# Patient Record
Sex: FEMALE | Race: BLACK OR AFRICAN AMERICAN | Employment: OTHER | URBAN - METROPOLITAN AREA
[De-identification: names, ages, dates, MRNs, and addresses within clinical notes are randomized per-mention and may not be internally consistent; named-entity substitution may affect disease eponyms.]

---

## 2018-08-14 PROBLEM — R10.9 ABDOMINAL PAIN: Status: ACTIVE | Noted: 2018-08-14

## 2018-08-14 PROBLEM — R11.2 NAUSEA WITH VOMITING: Status: ACTIVE | Noted: 2018-08-14

## 2018-08-14 PROBLEM — T68.XXXA HYPOTHERMIA: Status: ACTIVE | Noted: 2018-08-14

## 2018-08-14 PROBLEM — R11.10 VOMITING: Status: ACTIVE | Noted: 2018-08-14

## 2018-08-15 PROBLEM — K31.84 GASTROPARESIS: Status: ACTIVE | Noted: 2018-08-15

## 2019-01-04 PROBLEM — I48.91 ATRIAL FIBRILLATION AND FLUTTER (HCC): Status: ACTIVE | Noted: 2019-01-04

## 2019-01-04 PROBLEM — R06.02 SHORT OF BREATH ON EXERTION: Status: ACTIVE | Noted: 2019-01-04

## 2019-01-04 PROBLEM — I48.92 ATRIAL FIBRILLATION AND FLUTTER (HCC): Status: ACTIVE | Noted: 2019-01-04

## 2019-08-06 ENCOUNTER — OFFICE VISIT (OUTPATIENT)
Dept: ONCOLOGY | Age: 59
End: 2019-08-06

## 2019-08-06 ENCOUNTER — HOSPITAL ENCOUNTER (OUTPATIENT)
Dept: ONCOLOGY | Age: 59
Discharge: HOME | End: 2019-08-06

## 2019-08-06 VITALS
SYSTOLIC BLOOD PRESSURE: 123 MMHG | HEIGHT: 67 IN | DIASTOLIC BLOOD PRESSURE: 82 MMHG | HEART RATE: 61 BPM | RESPIRATION RATE: 18 BRPM | WEIGHT: 272 LBS | BODY MASS INDEX: 42.69 KG/M2 | TEMPERATURE: 98.3 F | OXYGEN SATURATION: 98 %

## 2019-08-06 DIAGNOSIS — D50.8 OTHER IRON DEFICIENCY ANEMIA: ICD-10-CM

## 2019-08-06 DIAGNOSIS — D50.8 IRON DEFICIENCY ANEMIA SECONDARY TO INADEQUATE DIETARY IRON INTAKE: ICD-10-CM

## 2019-08-06 DIAGNOSIS — D50.0 IRON DEFICIENCY ANEMIA DUE TO CHRONIC BLOOD LOSS: Primary | ICD-10-CM

## 2019-08-06 LAB
BASO+EOS+MONOS # BLD AUTO: 0.7 K/UL (ref 0–2.3)
BASO+EOS+MONOS NFR BLD AUTO: 15 % (ref 0.1–17)
DIFFERENTIAL METHOD BLD: ABNORMAL
ERYTHROCYTE [DISTWIDTH] IN BLOOD BY AUTOMATED COUNT: 17.3 % (ref 11.5–14.5)
HCT VFR BLD AUTO: 26.8 % (ref 36–48)
HGB BLD-MCNC: 7.4 G/DL (ref 12–16)
LYMPHOCYTES # BLD: 1.1 K/UL (ref 1.1–5.9)
LYMPHOCYTES NFR BLD: 25 % (ref 14–44)
MCH RBC QN AUTO: 18.5 PG (ref 25–35)
MCHC RBC AUTO-ENTMCNC: 27.6 G/DL (ref 31–37)
MCV RBC AUTO: 66.8 FL (ref 78–102)
NEUTS SEG # BLD: 2.6 K/UL (ref 1.8–9.5)
NEUTS SEG NFR BLD: 60 % (ref 40–70)
PLATELET # BLD AUTO: 267 K/UL (ref 140–440)
RBC # BLD AUTO: 4.01 M/UL (ref 4.1–5.1)
WBC # BLD AUTO: 4.4 K/UL (ref 4.5–13)

## 2019-08-06 NOTE — PATIENT INSTRUCTIONS
Iron Deficiency Anemia: Care Instructions Your Care Instructions Anemia means that you do not have enough red blood cells. Red blood cells carry oxygen around your body. When you have anemia, it can make you pale, weak, and tired. Many things can cause anemia. The most common cause is loss of blood. This can happen if you have heavy menstrual periods. It can also happen if you have bleeding in your stomach or bowel. You can also get anemia if you don't have enough iron in your diet or if it's hard for your body to absorb iron. In some cases, pregnancy causes anemia. That's because a pregnant woman needs more iron. Your doctor may do more tests to find the cause of your anemia. If a disease or other health problem is causing it, your doctor will treat that problem. It's important to follow up with your doctor to make sure that your iron level returns to normal. 
Follow-up care is a key part of your treatment and safety. Be sure to make and go to all appointments, and call your doctor if you are having problems. It's also a good idea to know your test results and keep a list of the medicines you take. How can you care for yourself at home? · If your doctor recommended iron pills, take them as directed. ? Try to take the pills on an empty stomach. You can do this about 1 hour before or 2 hours after meals. But you may need to take iron with food to avoid an upset stomach. ? Do not take antacids or drink milk or anything with caffeine within 2 hours of when you take your iron. They can keep your body from absorbing the iron well. ? Vitamin C helps your body absorb iron. You may want to take iron pills with a glass of orange juice or some other food high in vitamin C. 
? Iron pills may cause stomach problems. These include heartburn, nausea, diarrhea, constipation, and cramps. It can help to drink plenty of fluids and include fruits, vegetables, and fiber in your diet. ? It's normal for iron pills to make your stool a greenish or grayish black. But internal bleeding can also cause dark stool. So it's important to tell your doctor about any color changes. ? Call your doctor if you think you are having a problem with your iron pills. Even after you start to feel better, it will take several months for your body to build up its supply of iron. ? If you miss a pill, don't take a double dose. ? Keep iron pills out of the reach of small children. Too much iron can be very dangerous. · Eat foods with a lot of iron. These include red meat, shellfish, poultry, and eggs. They also include beans, raisins, whole-grain bread, and leafy green vegetables. · Steam your vegetables. This is the best way to prepare them if you want to get as much iron as possible. · Be safe with medicines. Do not take nonsteroidal anti-inflammatory pain relievers unless your doctor tells you to. These include aspirin, naproxen (Aleve), and ibuprofen (Advil, Motrin). · Liquid iron can stain your teeth. But you can mix it with water or juice and drink it with a straw. Then it won't get on your teeth. When should you call for help? Call 911 anytime you think you may need emergency care. For example, call if: 
  · You passed out (lost consciousness).  
 Call your doctor now or seek immediate medical care if: 
  · You are short of breath.  
  · You are dizzy or light-headed, or you feel like you may faint.  
  · You have new or worse bleeding.  
 Watch closely for changes in your health, and be sure to contact your doctor if: 
  · You feel weaker or more tired than usual.  
  · You do not get better as expected. Where can you learn more? Go to http://willy-micki.info/. Enter R277 in the search box to learn more about \"Iron Deficiency Anemia: Care Instructions. \" Current as of: March 28, 2019 Content Version: 12.1 © 5341-2542 Healthwise, Incorporated.  Care instructions adapted under license by [address] (which disclaims liability or warranty for this information). If you have questions about a medical condition or this instruction, always ask your healthcare professional. [address] any warranty or liability for your use of this information.

## 2019-08-06 NOTE — PROGRESS NOTES
Hematology/Oncology Consultation Note    Name: Sharon Whipple  Date: 2019  : 1960    Scar De Oliveira MD       Ms. Parviz Chávez  is a 59 y.o. -American woman who was referred here for an evaluation of anemia. Subjective:     Chief complaint: Anemia    History of present illness:  Ms. Parviz Chávez is a 59-year-old -American woman who patient reports that she has been anemic for many years. She states that she does have internal hemorrhoids and at times she has noticed some blood in the stool. She denies having blood in her urine. She has been taking iron pills off and on for a very long time. Nonetheless she is complaining of some fatigue and weakness. Occasionally she feels shortness of breath and dizziness as well. She states that many years ago she was given intravenous iron therapy. She denies ever undergoing any type of bariatric surgical procedures for weight loss. I have explained to the patient that I have been able to find a lab test from 2019 which showed that her hemoglobin was 8.5 g/dL with hematocrit of 29.5%. Currently she has no additional complaints or concerns.     Past Medical History:   Diagnosis Date    Anemia 2010    Asthma 2010    Essential hypertension, benign 2010    High cholesterol     Hypertension     Nonspecific elevation of levels of transaminase or lactic acid dehydrogenase (LDH) 2010    Obstructive sleep apnea     TD (obstructive sleep apnea) 2010    Polyp of intestine 10/3/08    gastric hyperplastic polyp/Dr. Cheryl Holden    Seizure disorder ([facility] [address] 2010    Seizure disorder ([facility] [address]     daugther reported       Allergies   Allergen Reactions    Pcn [Penicillins] Hives       Past Surgical History:   Procedure Laterality Date    ABDOMEN SURGERY PROC UNLISTED      CHEST SURGERY PROCEDURE UNLISTED  2010    Desmoid Tumor [facility] Dr. Wendy Cobb  16    Dr. Tha Cowan 3/27/09, 4/16/09    Dr. Rosemarie Beckham [facility] [id number], [address] History     Socioeconomic History    Marital status: SINGLE     Spouse name: Not on file    Number of children: Not on file    Years of education: Not on file    Highest education level: Not on file   Occupational History    Not on file   Social Needs    Financial resource strain: Not on file    Food insecurity:     Worry: Not on file     Inability: Not on file    Transportation needs:     Medical: Not on file     Non-medical: Not on file   Tobacco Use    Smoking status: Never Smoker    Smokeless tobacco: Never Used   Substance and Sexual Activity    Alcohol use: No    Drug use: No    Sexual activity: Not Currently   Lifestyle    Physical activity:     Days per week: Not on file     Minutes per session: Not on file    Stress: Not on file   Relationships    Social connections:     Talks on phone: Not on file     Gets together: Not on file     Attends [employer] service: Not on file     Active member of club or organization: Not on file     Attends meetings of clubs or organizations: Not on file     Relationship status: Not on file    Intimate partner violence:     Fear of current or ex partner: Not on file     Emotionally abused: Not on file     Physically abused: Not on file     Forced sexual activity: Not on file   Other Topics Concern    Not on file   Social History Narrative    Not on file       Family History   Problem Relation Age of Onset    Hypertension Sister     Hypertension Brother     Asthma Daughter        Current Outpatient Medications   Medication Sig Dispense Refill    benzonatate (TESSALON) 100 mg capsule Take 1 Cap by mouth three (3) times daily as needed for Cough. 10 Cap 0    guaiFENesin (ROBITUSSIN) 100 mg/5 mL liquid Take 5 mL by mouth four (4) times daily as needed for Cough or Congestion. 1 Bottle 0    sucralfate (CARAFATE) 100 mg/mL suspension Take 2 tsp by mouth four (4) times daily.       furosemide (LASIX) 20 mg tablet Take 20 mg by mouth two (2) times a day.  ergocalciferol (VITAMIN D2) 50,000 unit capsule Take 50,000 Units by mouth every seven (7) days.  hydrALAZINE (APRESOLINE) 100 mg tablet Take 100 mg by mouth three (3) times daily.  valsartan-hydroCHLOROthiazide (DIOVAN-HCT) 320-12.5 mg per tablet Take 1 Tab by mouth daily.  cetirizine (ZYRTEC) 10 mg tablet Take 10 mg by mouth daily.  potassium chloride (K-DUR, KLOR-CON) 20 mEq tablet Take 20 mEq by mouth daily.  fluticasone (FLONASE ALLERGY RELIEF) 50 mcg/actuation nasal spray 1 [place] by Both Nostrils route two (2) times a day.  polyethylene glycol (MIRALAX) 17 gram packet Take 34 g with 16 oz beverage every day till regular bowel movements are established -> then continue 17 g everyday 60 Packet 1    ondansetron (ZOFRAN ODT) 4 mg disintegrating tablet Take 1 Tab by mouth every eight (8) hours as needed for Nausea. (Patient taking differently: Take 8 mg by mouth every eight (8) hours as needed for Nausea.) 30 Tab 0    methocarbamol (ROBAXIN-750) 750 mg tablet Take 1 Tab by mouth four (4) times daily. 16 Tab 0    simvastatin (ZOCOR) 10 mg tablet Take 10 mg by mouth nightly.  omeprazole (PRILOSEC) 40 mg capsule Take 40 mg by mouth daily.  albuterol (PROAIR HFA) 90 mcg/Actuation inhaler Take 1-2 Puffs by inhalation every four (4) hours as needed for Wheezing and Shortness of Breath. (Patient taking differently: Take 2 Puffs by inhalation every six (6) hours as needed for Wheezing or Shortness of Breath.) 3 Inhaler 5    fluticasone-salmeterol (ADVAIR DISKUS) 250-50 mcg/dose diskus inhaler Take 1 Puff by inhalation every twelve (12) hours. 1 Inhaler 5    albuterol (PROVENTIL VENTOLIN) 2.5 mg /3 mL (0.083 %) nebulizer solution 3 mL by Nebulization route every four (4) hours as needed for Wheezing.  1 Package 3     Review of Systems    General ROS:The patient has complaints of some fatigue and weakness. Psychological ROS: patient denies having any psychological symptoms such as hallucinations, depression or anxiety. Ophthalmic ROS:the patient denies having any visual impairment or eye discomfort. ENT ROS: there are no abnormalities reported. Allergy and Immunology ROS:the patient denies having any seasonal allergies or allergies to medications other than those already outlined above. Hematological and Lymphatic ROS: the patient denies having any bruising, bleeding or lymphadenopathy. Endocrine ROS: the patient denies having any heat or cold intolerance. There is no history of diabetes or thyroid disorders. Breast ROS: the patient denies having any history of breast mass, nipple discharge, or lumps. Respiratory ROS:the patient denies having any cough, shortness of breath, or dyspnea on exertion. Cardiovascular ROS: there are no complaints of chest pain, palpitations, chest pounding, or dyspnea on exertion. Gastrointestinal ROS: the patient denies having nausea, emesis, diarrhea, constipation, or blood in the stool. Genito-Urinary ROS: the patient denies having urinary urgency, frequency, or dysuria. Musculoskeletal ROS: with the exception of mild arthralgias the patient has no other musculoskeletal complaints. Neurological ROS: the patient denies having any numbness, tingling, or neurologic deficits. Dermatological ROS:patient denies having any unexplained rash, skin ulcerations, or hives. Objective:     Visit Vitals  /82   Pulse 61   Temp 98.3 °F (36.8 °C) (Oral)   Resp 18   Ht 5' 7\" (1.702 m)   Wt 123.4 kg (272 lb)   SpO2 98%   BMI 42.60 kg/m²        ECOGPS=1  Physical Exam:   Gen. Appearance: the patient is in no acute distress. Skin: There is no evidence of bruise or rash. HEENT: The head is normocephalic and atraumatic. The conjunctiva and sclera are clear. Pupils are equal, round, reactive to light, and accommodation. The extraocular movements are intact.   ENT reveals no oral mucosal lesions or ulcerations. Neck: Supple without lymphadenopathy or thyromegaly. Lungs: Clear to auscultation and percussion; there are no wheezes or rhonchi. Heart: Regular rate and rhythm; there are no murmurs, gallops, or rubs. Abdomen: Bowel sounds are present and normal.  There is no guarding, tenderness, or hepatosplenomegaly. Extremities: There is no clubbing, cyanosis, or edema. Neurologic: There are no focal neurologic deficits. Lymphatics: There is no palpable peripheral lymphadenopathy. Lab data: The CBC dated 5/6/2019 shows a hemoglobin of 8.5 g/dL with hematocrit of 29.5%. Assessment:   Chronic anemia: Have explained to the patient that it is highly likely that she is experiencing iron deficiency anemia. Additional diagnostic considerations include plasma cell dyscrasia and to a lesser degree myelodysplastic syndrome. Plan:   Chronic anemia: A comprehensive metabolic panel, iron profile, ferritin level, vitamin D level, vitamin B level, folic acid, reticulocyte count, erythropoietin level, and SPEP will be ordered. I will see the patient back in clinic in 2 weeks to review these data and to discuss options of management.     Follow-up in 2 weeks    Orders Placed This Encounter    COMPLETE CBC & AUTO DIFF WBC    METABOLIC PANEL, COMPREHENSIVE     Standing Status:   Future     Standing Expiration Date:   8/6/2020    IRON PROFILE     Standing Status:   Future     Standing Expiration Date:   8/6/2020    FERRITIN     Standing Status:   Future     Standing Expiration Date:   8/6/2020    VITAMIN D, 25 HYDROXY     Standing Status:   Future     Standing Expiration Date:   8/6/2020    VITAMIN B12 & FOLATE     Standing Status:   Future     Standing Expiration Date:   8/6/2020    ERYTHROPOIETIN     Standing Status:   Future     Standing Expiration Date:   8/6/2020    RETICULOCYTE COUNT     Standing Status:   Future     Standing Expiration Date:   8/6/2020    PROTEIN ELECTROPHORESIS     Standing Status:   Future     Standing Expiration Date:   8/6/2020           Brunilda Rizvi MD  8/6/2019      Please note: This document has been produced using voice recognition software. Unrecognized errors in transcription may be present.

## 2019-08-07 LAB
25(OH)D3 SERPL-MCNC: 42.8 NG/ML (ref 32–100)
A-G RATIO,AGRAT: 1.8 RATIO (ref 1.1–2.6)
ABSOLUTE RETIC COUNT,RETA: 0.07 M/UL (ref 0.03–0.1)
ALBUMIN SERPL-MCNC: 4.2 G/DL (ref 3.5–5)
ALBUMIN, 001488: 56.3 % (ref 46.6–62.6)
ALP SERPL-CCNC: 95 U/L (ref 40–120)
ALPHA-1-GLOBULIN, 001489: 3.8 % (ref 1.7–4.1)
ALPHA-2-GLOBULIN, 001490: 11 % (ref 5.9–13.5)
ALT SERPL-CCNC: 17 U/L (ref 5–40)
ANION GAP SERPL CALC-SCNC: 13 MMOL/L
AST SERPL W P-5'-P-CCNC: 20 U/L (ref 10–37)
BETA GLOBULIN, 001491: 14.4 % (ref 10.9–18.9)
BILIRUB SERPL-MCNC: 0.3 MG/DL (ref 0.2–1.2)
BUN SERPL-MCNC: 14 MG/DL (ref 6–22)
CALCIUM SERPL-MCNC: 8.6 MG/DL (ref 8.4–10.5)
CHLORIDE SERPL-SCNC: 102 MMOL/L (ref 98–110)
CO2 SERPL-SCNC: 26 MMOL/L (ref 20–32)
CREAT SERPL-MCNC: 0.6 MG/DL (ref 0.8–1.4)
FE % SATURATION,PSAT: ABNORMAL % (ref 20–50)
FERRITIN SERPL-MCNC: 7 NG/ML (ref 10–291)
FOLATE,FOL: 13.92 NG/ML
GAMMA GLOBULIN, 001492: 14.6 % (ref 11.6–24.4)
GFRAA, 66117: >60
GFRNA, 66118: >60
GLOBULIN,GLOB: 2.4 G/DL (ref 2–4)
GLUCOSE SERPL-MCNC: 89 MG/DL (ref 70–99)
IRON,IRN: <19 MCG/DL (ref 30–160)
PE INTERPRETATION, 107352: NORMAL
POTASSIUM SERPL-SCNC: 3.9 MMOL/L (ref 3.5–5.5)
PROT SERPL-MCNC: 6.6 G/DL (ref 6.2–8.1)
PROT SERPL-MCNC: 6.6 G/DL (ref 6.2–8.1)
RETIC COUNT, AUTOMATED,RETIC: 1.6 % (ref 0.5–2)
RETIC HEMOGLOBIN: 16.1 PG (ref 28.2–38.2)
SODIUM SERPL-SCNC: 141 MMOL/L (ref 133–145)
TIBC,TIBC: ABNORMAL MCG/DL (ref 228–428)
UIBC SERPL-MCNC: 335 MCG/DL (ref 110–370)
VIT B12 SERPL-MCNC: 303 PG/ML (ref 211–911)

## 2019-08-08 LAB — ERYTHROPOIETIN, 081424: 388.7 MIU/ML (ref 2.6–18.5)

## 2019-08-31 PROBLEM — I50.9 ACUTE CHF (CONGESTIVE HEART FAILURE) (HCC): Status: ACTIVE | Noted: 2019-08-31

## 2019-09-04 PROBLEM — I48.91 ATRIAL FIBRILLATION WITH RVR (HCC): Status: RESOLVED | Noted: 2019-09-04 | Resolved: 2019-09-04

## 2019-09-04 PROBLEM — I48.91 ATRIAL FIBRILLATION WITH RVR (HCC): Status: ACTIVE | Noted: 2019-09-04

## 2019-09-04 PROBLEM — I50.33 DIASTOLIC CHF, ACUTE ON CHRONIC (HCC): Status: ACTIVE | Noted: 2019-09-04

## 2019-09-04 PROBLEM — D50.9 IRON DEFICIENCY ANEMIA: Status: ACTIVE | Noted: 2019-09-04

## 2019-09-04 PROBLEM — I89.0 LYMPHEDEMA: Status: ACTIVE | Noted: 2019-09-04

## 2019-09-12 ENCOUNTER — OFFICE VISIT (OUTPATIENT)
Dept: ONCOLOGY | Age: 59
End: 2019-09-12

## 2019-09-12 VITALS
DIASTOLIC BLOOD PRESSURE: 78 MMHG | BODY MASS INDEX: 44.41 KG/M2 | TEMPERATURE: 97.3 F | WEIGHT: 293 LBS | OXYGEN SATURATION: 98 % | HEART RATE: 69 BPM | HEIGHT: 68 IN | RESPIRATION RATE: 17 BRPM | SYSTOLIC BLOOD PRESSURE: 143 MMHG

## 2019-09-12 DIAGNOSIS — D50.0 IRON DEFICIENCY ANEMIA DUE TO CHRONIC BLOOD LOSS: Primary | ICD-10-CM

## 2019-09-12 DIAGNOSIS — D50.8 IRON DEFICIENCY ANEMIA SECONDARY TO INADEQUATE DIETARY IRON INTAKE: ICD-10-CM

## 2019-09-12 NOTE — PATIENT INSTRUCTIONS
Iron Deficiency Anemia: Care Instructions  Your Care Instructions    Anemia means that you do not have enough red blood cells. Red blood cells carry oxygen around your body. When you have anemia, it can make you pale, weak, and tired. Many things can cause anemia. The most common cause is loss of blood. This can happen if you have heavy menstrual periods. It can also happen if you have bleeding in your stomach or bowel. You can also get anemia if you don't have enough iron in your diet or if it's hard for your body to absorb iron. In some cases, pregnancy causes anemia. That's because a pregnant woman needs more iron. Your doctor may do more tests to find the cause of your anemia. If a disease or other health problem is causing it, your doctor will treat that problem. It's important to follow up with your doctor to make sure that your iron level returns to normal.  Follow-up care is a key part of your treatment and safety. Be sure to make and go to all appointments, and call your doctor if you are having problems. It's also a good idea to know your test results and keep a list of the medicines you take. How can you care for yourself at home? · If your doctor recommended iron pills, take them as directed. ? Try to take the pills on an empty stomach. You can do this about 1 hour before or 2 hours after meals. But you may need to take iron with food to avoid an upset stomach. ? Do not take antacids or drink milk or anything with caffeine within 2 hours of when you take your iron. They can keep your body from absorbing the iron well. ? Vitamin C helps your body absorb iron. You may want to take iron pills with a glass of orange juice or some other food high in vitamin C.  ? Iron pills may cause stomach problems. These include heartburn, nausea, diarrhea, constipation, and cramps. It can help to drink plenty of fluids and include fruits, vegetables, and fiber in your diet.   ? It's normal for iron pills to make your stool a greenish or grayish black. But internal bleeding can also cause dark stool. So it's important to tell your doctor about any color changes. ? Call your doctor if you think you are having a problem with your iron pills. Even after you start to feel better, it will take several months for your body to build up its supply of iron. ? If you miss a pill, don't take a double dose. ? Keep iron pills out of the reach of small children. Too much iron can be very dangerous. · Eat foods with a lot of iron. These include red meat, shellfish, poultry, and eggs. They also include beans, raisins, whole-grain bread, and leafy green vegetables. · Steam your vegetables. This is the best way to prepare them if you want to get as much iron as possible. · Be safe with medicines. Do not take nonsteroidal anti-inflammatory pain relievers unless your doctor tells you to. These include aspirin, naproxen (Aleve), and ibuprofen (Advil, Motrin). · Liquid iron can stain your teeth. But you can mix it with water or juice and drink it with a straw. Then it won't get on your teeth. When should you call for help? Call 911 anytime you think you may need emergency care. For example, call if:    · You passed out (lost consciousness).    Call your doctor now or seek immediate medical care if:    · You are short of breath.     · You are dizzy or light-headed, or you feel like you may faint.     · You have new or worse bleeding.    Watch closely for changes in your health, and be sure to contact your doctor if:    · You feel weaker or more tired than usual.     · You do not get better as expected. Where can you learn more? Go to http://willy-micki.info/. Enter M894 in the search box to learn more about \"Iron Deficiency Anemia: Care Instructions. \"  Current as of: March 28, 2019  Content Version: 12.1  © 1036-4232 Healthwise, Incorporated.  Care instructions adapted under license by [employer] (which disclaims liability or warranty for this information). If you have questions about a medical condition or this instruction, always ask your healthcare professional. [address] any warranty or liability for your use of this information.

## 2019-09-12 NOTE — PROGRESS NOTES
Hematology/medical oncology progress note    9/12/2019  Mayco Robles Covert  YOB: 1960    PCP: Dr. Daneen Paget    Diagnosis: Severe iron deficiency anemia    I have explained to Ms. Robles Covert that her lab test from 9/4/2019 showed that her WBC count was 9.3, hemoglobin was 8.7 g/dL, hematocrit 32.3%, and the platelet count was 125,174. The basic metabolic panel revealed that she had a normal kidney function with a creatinine of 0.9 and a BUN of 20. The serum protein electrophoresis from 8/7/2019 showed no evidence of a monoclonal paraprotein. The SPEP is within normal limits. Her reticulocyte count was normal at 1.6 but her erythropoietin level was elevated at 388. 7. Her vitamin D level was 42.8 ng/mL, the B12 level was 303 pg/mL, and the folate was 13.92 ng/mL. Iron studies reveal that iron level was less than 19 mcg/dL with an iron saturation that was too low to calculate. A ferritin level was exceedingly low at 7 ng/mL. Based on these data she has severe iron deficiency anemia and therefore I am recommending intravenous iron in the form of Injectafer at a dose of 750 mg given for 2 doses 1 week apart. We will plan to begin her treatment next week. I have reviewed the risk, benefits, potential side effects of the intravenous iron therapy with the patient. I will see her back in clinic in 8 weeks. Total time 25 minutes, greater than 50% of the time was in counseling and coordination of care. Tera Syed MD, [place]

## 2019-09-24 ENCOUNTER — HOSPITAL ENCOUNTER (OUTPATIENT)
Dept: INFUSION THERAPY | Age: 59
Discharge: HOME | End: 2019-09-24
Payer: MEDICAID

## 2019-09-24 VITALS
SYSTOLIC BLOOD PRESSURE: 103 MMHG | TEMPERATURE: 97.4 F | RESPIRATION RATE: 19 BRPM | DIASTOLIC BLOOD PRESSURE: 65 MMHG | HEART RATE: 76 BPM | OXYGEN SATURATION: 93 %

## 2019-09-24 PROCEDURE — 96374 THER/PROPH/DIAG INJ IV PUSH: CPT

## 2019-09-24 PROCEDURE — 74011250636 HC RX REV CODE- 250/636: Performed by: INTERNAL MEDICINE

## 2019-09-24 RX ORDER — SODIUM CHLORIDE 0.9 % (FLUSH) 0.9 %
10-40 SYRINGE (ML) INJECTION AS NEEDED
Status: DISCONTINUED | OUTPATIENT
Start: 2019-09-24 | End: 2019-09-28 | Stop reason: HOSPADM

## 2019-09-24 RX ADMIN — FERRIC CARBOXYMALTOSE INJECTION 750 MG: 50 INJECTION, SOLUTION INTRAVENOUS at 10:56

## 2019-10-01 ENCOUNTER — HOSPITAL ENCOUNTER (OUTPATIENT)
Dept: INFUSION THERAPY | Age: 59
Discharge: HOME | End: 2019-10-01
Payer: MEDICAID

## 2019-10-01 VITALS
TEMPERATURE: 97.8 F | SYSTOLIC BLOOD PRESSURE: 106 MMHG | OXYGEN SATURATION: 97 % | HEART RATE: 81 BPM | RESPIRATION RATE: 18 BRPM | DIASTOLIC BLOOD PRESSURE: 72 MMHG

## 2019-10-01 PROCEDURE — 74011250636 HC RX REV CODE- 250/636: Performed by: INTERNAL MEDICINE

## 2019-10-01 PROCEDURE — 96374 THER/PROPH/DIAG INJ IV PUSH: CPT

## 2019-10-01 RX ORDER — SODIUM CHLORIDE 0.9 % (FLUSH) 0.9 %
10-40 SYRINGE (ML) INJECTION AS NEEDED
Status: DISCONTINUED | OUTPATIENT
Start: 2019-10-01 | End: 2019-10-05 | Stop reason: HOSPADM

## 2019-10-01 RX ADMIN — Medication 20 ML: at 09:10

## 2019-10-01 RX ADMIN — FERRIC CARBOXYMALTOSE INJECTION 750 MG: 50 INJECTION, SOLUTION INTRAVENOUS at 09:10

## 2019-10-01 NOTE — PROGRESS NOTES
[facility] Progress Note    Date: 2019    Name: Rosemary Gardner    MRN: [id number]         : 1960      Ms. Juan David Gupta was assessed and education was provided. She was accompanied by her daughter. Ms. Champ Goldberg vitals were reviewed and patient was observed for 5 minutes prior to treatment. Visit Vitals  /72 (BP 1 Location: Right arm, BP Patient Position: At rest;Sitting)   Pulse 81   Temp 97.8 °F (36.6 °C)   Resp 18   SpO2 97%   Breastfeeding? No       #24G PIV started in right [facility] x1 attempt. Injectafer 750mg IVP over 8mins. Patient declined to stay for observation period. PIV removed, gauze and coban placed. Ms. Juan David Gupta tolerated the infusion, and had no complaints. Patient armband removed and shredded. Ms. Juan David Gupta was discharged from [place] [address] in stable condition at [id number]. She is to follow up with [facility] in November.     Guillermina Govea RN  2019  [phone]

## 2022-03-18 PROBLEM — R06.02 SHORT OF BREATH ON EXERTION: Status: ACTIVE | Noted: 2019-01-04

## 2022-03-18 PROBLEM — R10.9 ABDOMINAL PAIN: Status: ACTIVE | Noted: 2018-08-14

## 2022-03-18 PROBLEM — I89.0 LYMPHEDEMA: Status: ACTIVE | Noted: 2019-09-04

## 2022-03-19 PROBLEM — R11.10 VOMITING: Status: ACTIVE | Noted: 2018-08-14

## 2022-03-19 PROBLEM — T68.XXXA HYPOTHERMIA: Status: ACTIVE | Noted: 2018-08-14

## 2022-03-19 PROBLEM — K31.84 GASTROPARESIS: Status: ACTIVE | Noted: 2018-08-15

## 2022-03-19 PROBLEM — I50.33 DIASTOLIC CHF, ACUTE ON CHRONIC (HCC): Status: ACTIVE | Noted: 2019-09-04

## 2022-03-19 PROBLEM — R11.2 NAUSEA WITH VOMITING: Status: ACTIVE | Noted: 2018-08-14

## 2022-03-20 PROBLEM — D50.9 IRON DEFICIENCY ANEMIA: Status: ACTIVE | Noted: 2019-09-04

## 2022-03-20 PROBLEM — I48.92 ATRIAL FIBRILLATION AND FLUTTER (HCC): Status: ACTIVE | Noted: 2019-01-04

## 2022-03-20 PROBLEM — I48.91 ATRIAL FIBRILLATION AND FLUTTER (HCC): Status: ACTIVE | Noted: 2019-01-04

## 2022-12-12 PROBLEM — R10.9 ABDOMINAL PAIN: Status: RESOLVED | Noted: 2018-08-14 | Resolved: 2022-12-12

## 2022-12-12 PROBLEM — I10 ESSENTIAL HYPERTENSION: Status: ACTIVE | Noted: 2018-03-05

## 2022-12-12 PROBLEM — R91.8 LUNG NODULES: Status: ACTIVE | Noted: 2019-01-18

## 2022-12-12 PROBLEM — E55.9 VITAMIN D DEFICIENCY: Status: ACTIVE | Noted: 2018-03-05

## 2022-12-12 PROBLEM — R73.03 PREDIABETES: Status: ACTIVE | Noted: 2022-07-06

## 2022-12-12 PROBLEM — E04.2 MULTIPLE THYROID NODULES: Status: ACTIVE | Noted: 2018-03-05

## 2022-12-12 PROBLEM — K21.00 GASTROESOPHAGEAL REFLUX DISEASE WITH ESOPHAGITIS: Status: ACTIVE | Noted: 2018-09-04

## 2022-12-12 PROBLEM — E78.00 HYPERCHOLESTEROLEMIA: Status: ACTIVE | Noted: 2018-03-05

## 2022-12-12 PROBLEM — F41.9 ANXIETY: Status: ACTIVE | Noted: 2018-03-05

## 2022-12-12 PROBLEM — K31.7 GASTRIC POLYPS: Status: ACTIVE | Noted: 2018-09-04

## 2022-12-12 PROBLEM — E66.813 OBESITY, CLASS III, BMI 40-49.9 (MORBID OBESITY) (HCC): Status: ACTIVE | Noted: 2020-03-23

## 2022-12-12 PROBLEM — J45.40 MODERATE PERSISTENT ASTHMA WITHOUT COMPLICATION: Status: ACTIVE | Noted: 2019-01-18

## 2022-12-12 PROBLEM — I50.9 CHF (CONGESTIVE HEART FAILURE) (HCC): Status: ACTIVE | Noted: 2022-06-17

## 2022-12-12 PROBLEM — E66.01 OBESITY, CLASS III, BMI 40-49.9 (MORBID OBESITY) (HCC): Status: ACTIVE | Noted: 2020-03-23

## 2022-12-12 PROBLEM — Q82.0 HEREDITARY LYMPHEDEMA: Status: ACTIVE | Noted: 2018-11-06

## 2022-12-12 PROBLEM — I50.33 DIASTOLIC CHF, ACUTE ON CHRONIC (HCC): Status: RESOLVED | Noted: 2019-09-04 | Resolved: 2022-12-12

## 2022-12-12 PROBLEM — J30.9 ALLERGIC RHINITIS: Status: ACTIVE | Noted: 2019-01-18

## 2022-12-12 PROBLEM — J44.1 COPD EXACERBATION (HCC): Status: ACTIVE | Noted: 2022-06-22

## 2022-12-12 PROBLEM — R11.10 VOMITING: Status: RESOLVED | Noted: 2018-08-14 | Resolved: 2022-12-12

## 2022-12-12 PROBLEM — T68.XXXA HYPOTHERMIA: Status: RESOLVED | Noted: 2018-08-14 | Resolved: 2022-12-12

## 2022-12-12 PROBLEM — R11.2 NAUSEA WITH VOMITING: Status: RESOLVED | Noted: 2018-08-14 | Resolved: 2022-12-12

## 2022-12-12 PROBLEM — R06.02 SHORT OF BREATH ON EXERTION: Status: RESOLVED | Noted: 2019-01-04 | Resolved: 2022-12-12

## 2023-01-12 PROBLEM — E78.5 HYPERLIPIDEMIA LDL GOAL <100: Status: ACTIVE | Noted: 2018-03-05

## 2023-01-12 PROBLEM — I89.0 LYMPHEDEMA: Status: RESOLVED | Noted: 2019-09-04 | Resolved: 2023-01-12

## 2023-01-25 LAB — LEFT VENTRICULAR EJECTION FRACTION, EXTERNAL: 65

## 2023-06-30 ENCOUNTER — OFFICE VISIT (OUTPATIENT)
Age: 63
End: 2023-06-30
Payer: COMMERCIAL

## 2023-06-30 VITALS
BODY MASS INDEX: 39.83 KG/M2 | DIASTOLIC BLOOD PRESSURE: 69 MMHG | OXYGEN SATURATION: 94 % | HEART RATE: 93 BPM | WEIGHT: 262.8 LBS | HEIGHT: 68 IN | RESPIRATION RATE: 20 BRPM | TEMPERATURE: 98.1 F | SYSTOLIC BLOOD PRESSURE: 112 MMHG

## 2023-06-30 DIAGNOSIS — Z86.79 HISTORY OF CHF (CONGESTIVE HEART FAILURE): ICD-10-CM

## 2023-06-30 DIAGNOSIS — E78.5 HYPERLIPIDEMIA LDL GOAL <100: ICD-10-CM

## 2023-06-30 DIAGNOSIS — I48.91 ATRIAL FIBRILLATION, UNSPECIFIED TYPE (HCC): ICD-10-CM

## 2023-06-30 DIAGNOSIS — E55.9 VITAMIN D DEFICIENCY: ICD-10-CM

## 2023-06-30 DIAGNOSIS — G40.909 SEIZURE DISORDER (HCC): ICD-10-CM

## 2023-06-30 DIAGNOSIS — D50.9 IRON DEFICIENCY ANEMIA, UNSPECIFIED IRON DEFICIENCY ANEMIA TYPE: ICD-10-CM

## 2023-06-30 DIAGNOSIS — R73.03 PREDIABETES: ICD-10-CM

## 2023-06-30 DIAGNOSIS — Z00.00 ANNUAL PHYSICAL EXAM: Primary | ICD-10-CM

## 2023-06-30 DIAGNOSIS — R06.02 SOB (SHORTNESS OF BREATH) ON EXERTION: ICD-10-CM

## 2023-06-30 DIAGNOSIS — R79.89 ELEVATED BRAIN NATRIURETIC PEPTIDE (BNP) LEVEL: ICD-10-CM

## 2023-06-30 DIAGNOSIS — Q82.0 HEREDITARY LYMPHEDEMA: ICD-10-CM

## 2023-06-30 DIAGNOSIS — Z11.59 ENCOUNTER FOR HEPATITIS C SCREENING TEST FOR LOW RISK PATIENT: ICD-10-CM

## 2023-06-30 PROCEDURE — 3078F DIAST BP <80 MM HG: CPT | Performed by: NURSE PRACTITIONER

## 2023-06-30 PROCEDURE — 3074F SYST BP LT 130 MM HG: CPT | Performed by: NURSE PRACTITIONER

## 2023-06-30 PROCEDURE — 99204 OFFICE O/P NEW MOD 45 MIN: CPT | Performed by: NURSE PRACTITIONER

## 2023-06-30 SDOH — ECONOMIC STABILITY: FOOD INSECURITY: WITHIN THE PAST 12 MONTHS, YOU WORRIED THAT YOUR FOOD WOULD RUN OUT BEFORE YOU GOT MONEY TO BUY MORE.: NEVER TRUE

## 2023-06-30 SDOH — ECONOMIC STABILITY: FOOD INSECURITY: WITHIN THE PAST 12 MONTHS, THE FOOD YOU BOUGHT JUST DIDN'T LAST AND YOU DIDN'T HAVE MONEY TO GET MORE.: NEVER TRUE

## 2023-06-30 SDOH — ECONOMIC STABILITY: INCOME INSECURITY: HOW HARD IS IT FOR YOU TO PAY FOR THE VERY BASICS LIKE FOOD, HOUSING, MEDICAL CARE, AND HEATING?: NOT VERY HARD

## 2023-06-30 SDOH — ECONOMIC STABILITY: HOUSING INSECURITY
IN THE LAST 12 MONTHS, WAS THERE A TIME WHEN YOU DID NOT HAVE A STEADY PLACE TO SLEEP OR SLEPT IN A SHELTER (INCLUDING NOW)?: NO

## 2023-06-30 ASSESSMENT — ENCOUNTER SYMPTOMS: SHORTNESS OF BREATH: 1

## 2023-06-30 ASSESSMENT — PATIENT HEALTH QUESTIONNAIRE - PHQ9
2. FEELING DOWN, DEPRESSED OR HOPELESS: 0
SUM OF ALL RESPONSES TO PHQ QUESTIONS 1-9: 0
1. LITTLE INTEREST OR PLEASURE IN DOING THINGS: 0
SUM OF ALL RESPONSES TO PHQ9 QUESTIONS 1 & 2: 0
SUM OF ALL RESPONSES TO PHQ QUESTIONS 1-9: 0

## 2023-07-01 LAB
A/G RATIO: 1.5 RATIO (ref 1.1–2.6)
ALBUMIN SERPL-MCNC: 4.3 G/DL (ref 3.5–5)
ALP BLD-CCNC: 111 U/L (ref 40–120)
ALT SERPL-CCNC: 20 U/L (ref 5–40)
ANION GAP SERPL CALCULATED.3IONS-SCNC: 12 MMOL/L (ref 3–15)
AST SERPL-CCNC: 28 U/L (ref 10–37)
AVERAGE GLUCOSE: 123 MG/DL (ref 91–123)
BACTERIA: NEGATIVE
BASOPHILS # BLD: 1 % (ref 0–2)
BASOPHILS ABSOLUTE: 0 K/UL (ref 0–0.2)
BILIRUB SERPL-MCNC: 0.2 MG/DL (ref 0.2–1.2)
BILIRUB SERPL-MCNC: NEGATIVE MG/DL
BLOOD: NEGATIVE
BUN BLDV-MCNC: 17 MG/DL (ref 6–22)
CALCIUM SERPL-MCNC: 9 MG/DL (ref 8.4–10.5)
CHLORIDE BLD-SCNC: 106 MMOL/L (ref 98–110)
CHOLESTEROL/HDL RATIO: 5.7 (ref 0–5)
CHOLESTEROL: 200 MG/DL (ref 110–200)
CLARITY: CLEAR
CO2: 26 MMOL/L (ref 20–32)
COLOR: YELLOW
CREAT SERPL-MCNC: 0.9 MG/DL (ref 0.8–1.4)
EOSINOPHIL # BLD: 2 % (ref 0–6)
EOSINOPHILS ABSOLUTE: 0.1 K/UL (ref 0–0.5)
EPITHELIAL CELLS: NORMAL /HPF
FERRITIN: 32 NG/ML (ref 10–291)
GLOBULIN: 2.8 G/DL (ref 2–4)
GLOMERULAR FILTRATION RATE: >60 ML/MIN/1.73 SQ.M.
GLUCOSE: 98 MG/DL (ref 70–99)
GLUCOSE: NEGATIVE MG/DL
HBA1C MFR BLD: 5.9 % (ref 4.8–5.6)
HCT VFR BLD CALC: 42.1 % (ref 35.1–48)
HDLC SERPL-MCNC: 35 MG/DL
HEMOGLOBIN: 12.5 G/DL (ref 11.7–16)
HYALINE CASTS: NORMAL /LPF (ref 0–2)
IRON % SATURATION: 6 % (ref 20–50)
IRON: 23 MCG/DL (ref 30–160)
KETONES, URINE: NEGATIVE MG/DL
LDL CHOLESTEROL CALCULATED: 144 MG/DL (ref 50–99)
LDL/HDL RATIO: 4.1
LEUKOCYTE ESTERASE, URINE: NEGATIVE
LYMPHOCYTES # BLD: 26 % (ref 20–45)
LYMPHOCYTES ABSOLUTE: 1.2 K/UL (ref 1–4.8)
MCH RBC QN AUTO: 25 PG (ref 26–34)
MCHC RBC AUTO-ENTMCNC: 30 G/DL (ref 31–36)
MCV RBC AUTO: 85 FL (ref 80–99)
MONOCYTES ABSOLUTE: 0.6 K/UL (ref 0.1–1)
MONOCYTES: 12 % (ref 3–12)
NEUTROPHILS ABSOLUTE: 2.6 K/UL (ref 1.8–7.7)
NEUTROPHILS: 58 % (ref 40–75)
NITRITE, URINE: NEGATIVE
NON-HDL CHOLESTEROL: 165 MG/DL
PDW BLD-RTO: 17.1 % (ref 10–15.5)
PH, URINE: 5.5 PH (ref 5–8)
PLATELET # BLD: 318 K/UL (ref 140–440)
PMV BLD AUTO: 10.2 FL (ref 9–13)
POTASSIUM SERPL-SCNC: 3.7 MMOL/L (ref 3.5–5.5)
PROTEIN UA: NEGATIVE MG/DL
RBC URINE: NORMAL /HPF
RBC: 4.97 M/UL (ref 3.8–5.2)
SODIUM BLD-SCNC: 144 MMOL/L (ref 133–145)
SPECIFIC GRAVITY: 1.01 (ref 1–1.03)
TOTAL IRON BINDING CAPACITY: 381 MCG/DL (ref 228–428)
TOTAL PROTEIN: 7.1 G/DL (ref 6.2–8.1)
TRIGL SERPL-MCNC: 104 MG/DL (ref 40–149)
TSH SERPL DL<=0.05 MIU/L-ACNC: 1.85 MCU/ML (ref 0.27–4.2)
UIBC: 358 MCG/DL (ref 110–370)
UROBILINOGEN: 0.2 MG/DL
VITAMIN D 25-HYDROXY: 51 NG/ML (ref 32–100)
VLDLC SERPL CALC-MCNC: 21 MG/DL (ref 8–30)
WBC UA: NORMAL /HPF (ref 0–5)
WBC: 4.5 K/UL (ref 4–11)

## 2023-07-08 DIAGNOSIS — E78.5 HYPERLIPIDEMIA LDL GOAL <100: ICD-10-CM

## 2023-07-08 DIAGNOSIS — D50.9 IRON DEFICIENCY ANEMIA, UNSPECIFIED IRON DEFICIENCY ANEMIA TYPE: Primary | ICD-10-CM

## 2023-07-08 RX ORDER — FERROUS SULFATE 325(65) MG
325 TABLET ORAL
Qty: 90 TABLET | Refills: 1 | Status: SHIPPED | OUTPATIENT
Start: 2023-07-08

## 2023-07-08 RX ORDER — SIMVASTATIN 10 MG
10 TABLET ORAL NIGHTLY
Qty: 90 TABLET | Refills: 1 | Status: SHIPPED | OUTPATIENT
Start: 2023-07-08

## 2023-07-12 ENCOUNTER — TELEPHONE (OUTPATIENT)
Age: 63
End: 2023-07-12

## 2023-07-12 NOTE — TELEPHONE ENCOUNTER
Went over labs result w patient and medications. She verbalized a  understanding. Does she still need to be seen on 7/17?     Nita Latif

## 2023-07-12 NOTE — TELEPHONE ENCOUNTER
----- Message from JUMA Cabrera - NP sent at 7/8/2023 11:27 PM EDT -----  Cbc shows mild anemia with low iron and low iron saturation with normal ferritin, need daily oral iron replacement    Cholesterol is showing elevated LDL of 144, low HDL- needs to return to taking statin therapy- Zocor    A1C is 5.9 prediabetes.     Urine, kidney, liver good

## 2023-08-08 RX ORDER — METOPROLOL TARTRATE 50 MG/1
TABLET, FILM COATED ORAL
Qty: 60 TABLET | Refills: 3 | OUTPATIENT
Start: 2023-08-08

## 2023-08-08 RX ORDER — MONTELUKAST SODIUM 10 MG/1
TABLET ORAL
Qty: 90 TABLET | Refills: 3 | Status: SHIPPED | OUTPATIENT
Start: 2023-08-08

## 2023-08-08 NOTE — TELEPHONE ENCOUNTER
Last Visit: 06- OV   Next Appointment: 10-      Requested Prescriptions     Pending Prescriptions Disp Refills    metoprolol tartrate (LOPRESSOR) 50 MG tablet [Pharmacy Med Name: METOPROLOL TARTRATE 50 MG T 50 Tablet] 60 tablet 3     Sig: TAKE 1 TABLET BY MOUTH 2 TIMES A DAY    montelukast (SINGULAIR) 10 MG tablet [Pharmacy Med Name: MONTELUKAST SODIUM 10 MG TA 10 Tablet] 30 tablet 3     Sig: TAKE 1 TABLET BY MOUTH EVERY NIGHT AT BEDTIME

## 2023-09-26 LAB — HEPATITIS C ANTIBODY: NORMAL

## 2023-12-30 DIAGNOSIS — E78.5 HYPERLIPIDEMIA LDL GOAL <100: ICD-10-CM

## 2024-01-02 NOTE — TELEPHONE ENCOUNTER
Last Visit: 10- OV   Next Appointment: none  Previous Refill Encounter: 07- #90 tabs with 1 refills    Requested Prescriptions     Pending Prescriptions Disp Refills    simvastatin (ZOCOR) 10 MG tablet [Pharmacy Med Name: SIMVASTATIN 10 MG TAB 10 Tablet] 90 tablet 1     Sig: TAKE 1 TABLET BY MOUTH NIGHTLY

## 2024-01-03 RX ORDER — SIMVASTATIN 10 MG
10 TABLET ORAL NIGHTLY
Qty: 90 TABLET | Refills: 1 | Status: SHIPPED | OUTPATIENT
Start: 2024-01-03

## 2024-08-09 ENCOUNTER — TRANSCRIBE ORDERS (OUTPATIENT)
Facility: HOSPITAL | Age: 64
End: 2024-08-09

## 2024-08-09 DIAGNOSIS — Z12.31 SCREENING MAMMOGRAM FOR HIGH-RISK PATIENT: Primary | ICD-10-CM

## 2024-08-09 DIAGNOSIS — Z78.0 POSTMENOPAUSAL STATUS, AGE-RELATED: Primary | ICD-10-CM

## 2024-09-05 ENCOUNTER — HOSPITAL ENCOUNTER (OUTPATIENT)
Facility: HOSPITAL | Age: 64
Discharge: HOME | End: 2024-09-08
Payer: MEDICAID

## 2024-09-05 DIAGNOSIS — Z12.31 SCREENING MAMMOGRAM FOR HIGH-RISK PATIENT: ICD-10-CM

## 2024-09-05 DIAGNOSIS — Z78.0 POSTMENOPAUSAL STATUS, AGE-RELATED: ICD-10-CM

## 2024-09-05 PROCEDURE — 77063 BREAST TOMOSYNTHESIS BI: CPT

## 2024-09-05 PROCEDURE — 77080 DXA BONE DENSITY AXIAL: CPT

## 2024-10-31 ENCOUNTER — HOSPITAL ENCOUNTER (OUTPATIENT)
Facility: HOSPITAL | Age: 64
Discharge: HOME | End: 2024-11-02
Payer: MEDICARE

## 2024-10-31 DIAGNOSIS — I73.9 PAD (PERIPHERAL ARTERY DISEASE) (HCC): ICD-10-CM

## 2024-10-31 LAB
VAS LEFT ABI: 1.03
VAS LEFT ARM BP: 106 MMHG
VAS LEFT DORSALIS PEDIS BP: 109 MMHG
VAS LEFT PTA BP: 111 MMHG
VAS LEFT TBI: 0.77
VAS LEFT TOE PRESSURE: 83 MMHG
VAS RIGHT ABI: 1.19
VAS RIGHT ARM BP: 108 MMHG
VAS RIGHT DORSALIS PEDIS BP: 129 MMHG
VAS RIGHT PTA BP: 129 MMHG
VAS RIGHT TBI: 0.8
VAS RIGHT TOE PRESSURE: 86 MMHG

## 2024-10-31 PROCEDURE — 93922 UPR/L XTREMITY ART 2 LEVELS: CPT

## 2025-03-11 DIAGNOSIS — E78.5 HYPERLIPIDEMIA LDL GOAL <100: ICD-10-CM

## 2025-03-12 RX ORDER — SIMVASTATIN 10 MG
10 TABLET ORAL NIGHTLY
Qty: 90 TABLET | Refills: 2 | OUTPATIENT
Start: 2025-03-12

## 2025-07-18 ENCOUNTER — APPOINTMENT (OUTPATIENT)
Facility: HOSPITAL | Age: 65
DRG: 871 | End: 2025-07-18
Payer: MEDICARE

## 2025-07-18 ENCOUNTER — HOSPITAL ENCOUNTER (INPATIENT)
Facility: HOSPITAL | Age: 65
LOS: 6 days | Discharge: HOME HEALTH | DRG: 871 | End: 2025-07-24
Attending: EMERGENCY MEDICINE | Admitting: STUDENT IN AN ORGANIZED HEALTH CARE EDUCATION/TRAINING PROGRAM
Payer: MEDICARE

## 2025-07-18 DIAGNOSIS — A41.9 SEPSIS WITH ACUTE RENAL FAILURE, DUE TO UNSPECIFIED ORGANISM, UNSPECIFIED ACUTE RENAL FAILURE TYPE, UNSPECIFIED WHETHER SEPTIC SHOCK PRESENT (HCC): Primary | ICD-10-CM

## 2025-07-18 DIAGNOSIS — I50.9 CONGESTIVE HEART FAILURE, UNSPECIFIED HF CHRONICITY, UNSPECIFIED HEART FAILURE TYPE (HCC): ICD-10-CM

## 2025-07-18 DIAGNOSIS — N17.9 SEPSIS WITH ACUTE RENAL FAILURE, DUE TO UNSPECIFIED ORGANISM, UNSPECIFIED ACUTE RENAL FAILURE TYPE, UNSPECIFIED WHETHER SEPTIC SHOCK PRESENT (HCC): Primary | ICD-10-CM

## 2025-07-18 DIAGNOSIS — I10 ESSENTIAL HYPERTENSION: ICD-10-CM

## 2025-07-18 DIAGNOSIS — R65.20 SEPSIS WITH ACUTE RENAL FAILURE, DUE TO UNSPECIFIED ORGANISM, UNSPECIFIED ACUTE RENAL FAILURE TYPE, UNSPECIFIED WHETHER SEPTIC SHOCK PRESENT (HCC): Primary | ICD-10-CM

## 2025-07-18 PROBLEM — N39.0 COMPLICATED UTI (URINARY TRACT INFECTION): Status: ACTIVE | Noted: 2025-07-18

## 2025-07-18 LAB
ALBUMIN SERPL-MCNC: 3.6 G/DL (ref 3.4–5)
ALBUMIN/GLOB SERPL: 1.2 (ref 0.8–1.7)
ALP SERPL-CCNC: 83 U/L (ref 45–117)
ALT SERPL-CCNC: 20 U/L (ref 10–35)
ANION GAP SERPL CALC-SCNC: 18 MMOL/L (ref 3–18)
APPEARANCE UR: CLEAR
AST SERPL-CCNC: 26 U/L (ref 10–38)
BACTERIA URNS QL MICRO: ABNORMAL /HPF
BASOPHILS # BLD: 0.03 K/UL (ref 0–0.1)
BASOPHILS NFR BLD: 0.5 % (ref 0–2)
BILIRUB SERPL-MCNC: 0.3 MG/DL (ref 0.2–1)
BILIRUB UR QL: NEGATIVE
BUN SERPL-MCNC: 62 MG/DL (ref 6–23)
BUN/CREAT SERPL: 31 (ref 12–20)
CALCIUM SERPL-MCNC: 9.6 MG/DL (ref 8.5–10.1)
CHLORIDE SERPL-SCNC: 87 MMOL/L (ref 98–107)
CO2 SERPL-SCNC: 30 MMOL/L (ref 21–32)
COLOR UR: YELLOW
CREAT SERPL-MCNC: 2.03 MG/DL (ref 0.6–1.3)
DIFFERENTIAL METHOD BLD: ABNORMAL
EOSINOPHIL # BLD: 0.1 K/UL (ref 0–0.4)
EOSINOPHIL NFR BLD: 1.5 % (ref 0–5)
EPITH CASTS URNS QL MICRO: ABNORMAL /LPF (ref 0–5)
ERYTHROCYTE [DISTWIDTH] IN BLOOD BY AUTOMATED COUNT: 13.2 % (ref 11.6–14.5)
GLOBULIN SER CALC-MCNC: 3.1 G/DL (ref 2–4)
GLUCOSE SERPL-MCNC: 109 MG/DL (ref 74–108)
GLUCOSE UR STRIP.AUTO-MCNC: NEGATIVE MG/DL
HCT VFR BLD AUTO: 39.6 % (ref 35–45)
HGB BLD-MCNC: 13.7 G/DL (ref 12–16)
HGB UR QL STRIP: NEGATIVE
IMM GRANULOCYTES # BLD AUTO: 0.03 K/UL (ref 0–0.04)
IMM GRANULOCYTES NFR BLD AUTO: 0.5 % (ref 0–0.5)
KETONES UR QL STRIP.AUTO: NEGATIVE MG/DL
LACTATE BLD-SCNC: 1.5 MMOL/L (ref 0.4–2)
LEUKOCYTE ESTERASE UR QL STRIP.AUTO: ABNORMAL
LIPASE SERPL-CCNC: 35 U/L (ref 13–75)
LYMPHOCYTES # BLD: 1.94 K/UL (ref 0.9–3.6)
LYMPHOCYTES NFR BLD: 29.2 % (ref 21–52)
MAGNESIUM SERPL-MCNC: 2.1 MG/DL (ref 1.6–2.6)
MCH RBC QN AUTO: 29.6 PG (ref 24–34)
MCHC RBC AUTO-ENTMCNC: 34.6 G/DL (ref 31–37)
MCV RBC AUTO: 85.5 FL (ref 78–100)
MONOCYTES # BLD: 0.78 K/UL (ref 0.05–1.2)
MONOCYTES NFR BLD: 11.7 % (ref 3–10)
NEUTS SEG # BLD: 3.77 K/UL (ref 1.8–8)
NEUTS SEG NFR BLD: 56.6 % (ref 40–73)
NITRITE UR QL STRIP.AUTO: NEGATIVE
NRBC # BLD: 0 K/UL (ref 0–0.01)
NRBC BLD-RTO: 0 PER 100 WBC
NT PRO BNP: 266 PG/ML (ref 36–900)
PH UR STRIP: 6.5 (ref 5–8)
PLATELET # BLD AUTO: 316 K/UL (ref 135–420)
PMV BLD AUTO: 9.5 FL (ref 9.2–11.8)
POTASSIUM SERPL-SCNC: 2.5 MMOL/L (ref 3.5–5.5)
PROCALCITONIN SERPL-MCNC: 0.06 NG/ML
PROT SERPL-MCNC: 6.7 G/DL (ref 6.4–8.2)
PROT UR STRIP-MCNC: NEGATIVE MG/DL
RBC # BLD AUTO: 4.63 M/UL (ref 4.2–5.3)
RBC #/AREA URNS HPF: ABNORMAL /HPF (ref 0–5)
SODIUM SERPL-SCNC: 135 MMOL/L (ref 136–145)
SP GR UR REFRACTOMETRY: 1.01 (ref 1–1.03)
TROPONIN T SERPL HS-MCNC: 32.9 NG/L (ref 0–14)
UROBILINOGEN UR QL STRIP.AUTO: 0.2 EU/DL (ref 0.2–1)
WBC # BLD AUTO: 6.7 K/UL (ref 4.6–13.2)
WBC URNS QL MICRO: ABNORMAL /HPF (ref 0–5)

## 2025-07-18 PROCEDURE — 83880 ASSAY OF NATRIURETIC PEPTIDE: CPT

## 2025-07-18 PROCEDURE — 70450 CT HEAD/BRAIN W/O DYE: CPT

## 2025-07-18 PROCEDURE — 99285 EMERGENCY DEPT VISIT HI MDM: CPT

## 2025-07-18 PROCEDURE — 2580000003 HC RX 258: Performed by: STUDENT IN AN ORGANIZED HEALTH CARE EDUCATION/TRAINING PROGRAM

## 2025-07-18 PROCEDURE — 93005 ELECTROCARDIOGRAM TRACING: CPT

## 2025-07-18 PROCEDURE — 6360000002 HC RX W HCPCS: Performed by: STUDENT IN AN ORGANIZED HEALTH CARE EDUCATION/TRAINING PROGRAM

## 2025-07-18 PROCEDURE — 71046 X-RAY EXAM CHEST 2 VIEWS: CPT

## 2025-07-18 PROCEDURE — 6370000000 HC RX 637 (ALT 250 FOR IP): Performed by: STUDENT IN AN ORGANIZED HEALTH CARE EDUCATION/TRAINING PROGRAM

## 2025-07-18 PROCEDURE — 83735 ASSAY OF MAGNESIUM: CPT

## 2025-07-18 PROCEDURE — 83690 ASSAY OF LIPASE: CPT

## 2025-07-18 PROCEDURE — 2500000003 HC RX 250 WO HCPCS: Performed by: STUDENT IN AN ORGANIZED HEALTH CARE EDUCATION/TRAINING PROGRAM

## 2025-07-18 PROCEDURE — 96374 THER/PROPH/DIAG INJ IV PUSH: CPT

## 2025-07-18 PROCEDURE — 94761 N-INVAS EAR/PLS OXIMETRY MLT: CPT

## 2025-07-18 PROCEDURE — 84484 ASSAY OF TROPONIN QUANT: CPT

## 2025-07-18 PROCEDURE — 80053 COMPREHEN METABOLIC PANEL: CPT

## 2025-07-18 PROCEDURE — 83605 ASSAY OF LACTIC ACID: CPT

## 2025-07-18 PROCEDURE — 99223 1ST HOSP IP/OBS HIGH 75: CPT | Performed by: STUDENT IN AN ORGANIZED HEALTH CARE EDUCATION/TRAINING PROGRAM

## 2025-07-18 PROCEDURE — 96361 HYDRATE IV INFUSION ADD-ON: CPT

## 2025-07-18 PROCEDURE — 85025 COMPLETE CBC W/AUTO DIFF WBC: CPT

## 2025-07-18 PROCEDURE — 84145 PROCALCITONIN (PCT): CPT

## 2025-07-18 PROCEDURE — 6360000002 HC RX W HCPCS: Performed by: EMERGENCY MEDICINE

## 2025-07-18 PROCEDURE — 1100000003 HC PRIVATE W/ TELEMETRY

## 2025-07-18 PROCEDURE — 2580000003 HC RX 258

## 2025-07-18 PROCEDURE — 94640 AIRWAY INHALATION TREATMENT: CPT

## 2025-07-18 PROCEDURE — 87086 URINE CULTURE/COLONY COUNT: CPT

## 2025-07-18 PROCEDURE — 81001 URINALYSIS AUTO W/SCOPE: CPT

## 2025-07-18 RX ORDER — MAGNESIUM SULFATE IN WATER 40 MG/ML
2000 INJECTION, SOLUTION INTRAVENOUS PRN
Status: DISCONTINUED | OUTPATIENT
Start: 2025-07-18 | End: 2025-07-24 | Stop reason: HOSPADM

## 2025-07-18 RX ORDER — SODIUM CHLORIDE, SODIUM LACTATE, POTASSIUM CHLORIDE, AND CALCIUM CHLORIDE .6; .31; .03; .02 G/100ML; G/100ML; G/100ML; G/100ML
500 INJECTION, SOLUTION INTRAVENOUS ONCE
Status: COMPLETED | OUTPATIENT
Start: 2025-07-18 | End: 2025-07-18

## 2025-07-18 RX ORDER — ALBUTEROL SULFATE 0.83 MG/ML
2.5 SOLUTION RESPIRATORY (INHALATION) EVERY 4 HOURS PRN
Status: DISCONTINUED | OUTPATIENT
Start: 2025-07-18 | End: 2025-07-24 | Stop reason: HOSPADM

## 2025-07-18 RX ORDER — POTASSIUM CHLORIDE 7.45 MG/ML
10 INJECTION INTRAVENOUS
Status: DISCONTINUED | OUTPATIENT
Start: 2025-07-18 | End: 2025-07-18

## 2025-07-18 RX ORDER — POLYETHYLENE GLYCOL 3350 17 G/17G
17 POWDER, FOR SOLUTION ORAL DAILY PRN
Status: DISCONTINUED | OUTPATIENT
Start: 2025-07-18 | End: 2025-07-24 | Stop reason: HOSPADM

## 2025-07-18 RX ORDER — POTASSIUM CHLORIDE 7.45 MG/ML
10 INJECTION INTRAVENOUS
Status: DISPENSED | OUTPATIENT
Start: 2025-07-18 | End: 2025-07-18

## 2025-07-18 RX ORDER — ONDANSETRON 2 MG/ML
4 INJECTION INTRAMUSCULAR; INTRAVENOUS EVERY 6 HOURS PRN
Status: DISCONTINUED | OUTPATIENT
Start: 2025-07-18 | End: 2025-07-24 | Stop reason: HOSPADM

## 2025-07-18 RX ORDER — SENNA AND DOCUSATE SODIUM 50; 8.6 MG/1; MG/1
1 TABLET, FILM COATED ORAL 2 TIMES DAILY
Status: DISCONTINUED | OUTPATIENT
Start: 2025-07-18 | End: 2025-07-24 | Stop reason: HOSPADM

## 2025-07-18 RX ORDER — HEPARIN SODIUM 5000 [USP'U]/ML
5000 INJECTION, SOLUTION INTRAVENOUS; SUBCUTANEOUS EVERY 8 HOURS SCHEDULED
Status: DISCONTINUED | OUTPATIENT
Start: 2025-07-18 | End: 2025-07-24 | Stop reason: HOSPADM

## 2025-07-18 RX ORDER — FERROUS SULFATE 325(65) MG
325 TABLET ORAL DAILY
Status: DISCONTINUED | OUTPATIENT
Start: 2025-07-18 | End: 2025-07-24 | Stop reason: HOSPADM

## 2025-07-18 RX ORDER — POTASSIUM CHLORIDE 7.45 MG/ML
10 INJECTION INTRAVENOUS PRN
Status: DISCONTINUED | OUTPATIENT
Start: 2025-07-18 | End: 2025-07-24 | Stop reason: HOSPADM

## 2025-07-18 RX ORDER — LEVOFLOXACIN 5 MG/ML
750 INJECTION, SOLUTION INTRAVENOUS
Status: DISCONTINUED | OUTPATIENT
Start: 2025-07-18 | End: 2025-07-18

## 2025-07-18 RX ORDER — ONDANSETRON 2 MG/ML
4 INJECTION INTRAMUSCULAR; INTRAVENOUS ONCE
Status: COMPLETED | OUTPATIENT
Start: 2025-07-18 | End: 2025-07-18

## 2025-07-18 RX ORDER — ALBUTEROL SULFATE 90 UG/1
2 INHALANT RESPIRATORY (INHALATION) EVERY 4 HOURS PRN
Status: DISCONTINUED | OUTPATIENT
Start: 2025-07-18 | End: 2025-07-18

## 2025-07-18 RX ORDER — ACETAMINOPHEN 325 MG/1
650 TABLET ORAL EVERY 6 HOURS PRN
Status: DISCONTINUED | OUTPATIENT
Start: 2025-07-18 | End: 2025-07-24 | Stop reason: HOSPADM

## 2025-07-18 RX ORDER — PANTOPRAZOLE SODIUM 40 MG/1
40 TABLET, DELAYED RELEASE ORAL
Status: DISCONTINUED | OUTPATIENT
Start: 2025-07-19 | End: 2025-07-20

## 2025-07-18 RX ORDER — SODIUM CHLORIDE 0.9 % (FLUSH) 0.9 %
5-40 SYRINGE (ML) INJECTION PRN
Status: DISCONTINUED | OUTPATIENT
Start: 2025-07-18 | End: 2025-07-24 | Stop reason: HOSPADM

## 2025-07-18 RX ORDER — SODIUM CHLORIDE, SODIUM LACTATE, POTASSIUM CHLORIDE, CALCIUM CHLORIDE 600; 310; 30; 20 MG/100ML; MG/100ML; MG/100ML; MG/100ML
INJECTION, SOLUTION INTRAVENOUS CONTINUOUS
Status: DISCONTINUED | OUTPATIENT
Start: 2025-07-18 | End: 2025-07-19

## 2025-07-18 RX ORDER — ACETAMINOPHEN 650 MG/1
650 SUPPOSITORY RECTAL EVERY 6 HOURS PRN
Status: DISCONTINUED | OUTPATIENT
Start: 2025-07-18 | End: 2025-07-24 | Stop reason: HOSPADM

## 2025-07-18 RX ORDER — POTASSIUM CHLORIDE 1500 MG/1
40 TABLET, EXTENDED RELEASE ORAL PRN
Status: DISCONTINUED | OUTPATIENT
Start: 2025-07-18 | End: 2025-07-24 | Stop reason: HOSPADM

## 2025-07-18 RX ORDER — FLUTICASONE PROPIONATE 50 MCG
1 SPRAY, SUSPENSION (ML) NASAL 2 TIMES DAILY
Status: DISCONTINUED | OUTPATIENT
Start: 2025-07-18 | End: 2025-07-24 | Stop reason: HOSPADM

## 2025-07-18 RX ORDER — ONDANSETRON 4 MG/1
4 TABLET, ORALLY DISINTEGRATING ORAL EVERY 8 HOURS PRN
Status: DISCONTINUED | OUTPATIENT
Start: 2025-07-18 | End: 2025-07-24 | Stop reason: HOSPADM

## 2025-07-18 RX ORDER — SODIUM CHLORIDE 9 MG/ML
INJECTION, SOLUTION INTRAVENOUS PRN
Status: DISCONTINUED | OUTPATIENT
Start: 2025-07-18 | End: 2025-07-24 | Stop reason: HOSPADM

## 2025-07-18 RX ORDER — MONTELUKAST SODIUM 10 MG/1
10 TABLET ORAL NIGHTLY
Status: DISCONTINUED | OUTPATIENT
Start: 2025-07-18 | End: 2025-07-24 | Stop reason: HOSPADM

## 2025-07-18 RX ORDER — SODIUM CHLORIDE 0.9 % (FLUSH) 0.9 %
5-40 SYRINGE (ML) INJECTION EVERY 12 HOURS SCHEDULED
Status: DISCONTINUED | OUTPATIENT
Start: 2025-07-18 | End: 2025-07-24 | Stop reason: HOSPADM

## 2025-07-18 RX ADMIN — HEPARIN SODIUM 5000 UNITS: 5000 INJECTION INTRAVENOUS; SUBCUTANEOUS at 21:35

## 2025-07-18 RX ADMIN — ONDANSETRON 4 MG: 2 INJECTION, SOLUTION INTRAMUSCULAR; INTRAVENOUS at 18:06

## 2025-07-18 RX ADMIN — POTASSIUM CHLORIDE 10 MEQ: 7.46 INJECTION, SOLUTION INTRAVENOUS at 23:11

## 2025-07-18 RX ADMIN — SODIUM CHLORIDE, SODIUM LACTATE, POTASSIUM CHLORIDE, AND CALCIUM CHLORIDE 500 ML: .6; .31; .03; .02 INJECTION, SOLUTION INTRAVENOUS at 18:01

## 2025-07-18 RX ADMIN — CEFTRIAXONE 1000 MG: 1 INJECTION, POWDER, FOR SOLUTION INTRAMUSCULAR; INTRAVENOUS at 21:27

## 2025-07-18 RX ADMIN — ARFORMOTEROL TARTRATE: 15 SOLUTION RESPIRATORY (INHALATION) at 20:52

## 2025-07-18 RX ADMIN — SODIUM CHLORIDE, SODIUM LACTATE, POTASSIUM CHLORIDE, AND CALCIUM CHLORIDE: .6; .31; .03; .02 INJECTION, SOLUTION INTRAVENOUS at 21:25

## 2025-07-18 RX ADMIN — POTASSIUM CHLORIDE 10 MEQ: 7.46 INJECTION, SOLUTION INTRAVENOUS at 21:26

## 2025-07-18 RX ADMIN — ONDANSETRON 4 MG: 2 INJECTION, SOLUTION INTRAMUSCULAR; INTRAVENOUS at 23:41

## 2025-07-18 RX ADMIN — POTASSIUM BICARBONATE 40 MEQ: 782 TABLET, EFFERVESCENT ORAL at 21:35

## 2025-07-18 RX ADMIN — DOXYCYCLINE 100 MG: 100 INJECTION, POWDER, LYOPHILIZED, FOR SOLUTION INTRAVENOUS at 21:34

## 2025-07-18 ASSESSMENT — PAIN - FUNCTIONAL ASSESSMENT: PAIN_FUNCTIONAL_ASSESSMENT: NONE - DENIES PAIN

## 2025-07-18 NOTE — ED TRIAGE NOTES
Pt accompanied by daughter to triage . Pt was seen by home health nurse this morning, and called her PCP because her blood pressure was low . Bp:96/54. Pt also getting more confused than usual . Hx of dementia

## 2025-07-18 NOTE — ED PROVIDER NOTES
Date    CATARACT REMOVAL  3/27/09, 4/16/09    Dr. Remy    CHEST SURGERY  1/2010    Desmoid Tumor EVMS Dr. Crispin GUILLEN BOWEL ADHESION,ENTEROLYSIS  2-12-16    Dr. De Leon    HYSTERECTOMY (CERVIX STATUS UNKNOWN)      HYSTERECTOMY, VAGINAL      OR UNLISTED PROCEDURE ABDOMEN PERITONEUM & OMENTUM         Family History:  Family History   Problem Relation Age of Onset    Hypertension Brother     Hypertension Sister     Asthma Daughter        Social History:   Social History     Tobacco Use    Smoking status: Never    Smokeless tobacco: Never   Vaping Use    Vaping status: Never Used   Substance Use Topics    Alcohol use: No    Drug use: No       Allergies:  Allergies   Allergen Reactions    Penicillins Hives         Physical Exam     Vitals:    07/18/25 1659 07/18/25 1700 07/18/25 1715 07/18/25 1930   BP:  108/68 (!) 80/58 104/73   Pulse: 96 94 95 82   Resp: 18 19 18 16   Temp:       SpO2: 96% 99% 99%        Physical Exam  Constitutional:       General: She is not in acute distress.     Appearance: She is obese.      Comments: Patient appears a little dry   HENT:      Head: Normocephalic and atraumatic.   Eyes:      General:         Right eye: No discharge.         Left eye: No discharge.   Cardiovascular:      Rate and Rhythm: Normal rate. Rhythm irregular.      Heart sounds: Normal heart sounds.   Pulmonary:      Comments: Exam limited by patient body habitus and positioning.  Superior anterior lung fields sound coarse bilaterally.  With upper airway sounds transmitted.  Patient also has a physiologic grunt with every exhalation.  Abdominal:      Palpations: Abdomen is soft.      Tenderness: There is abdominal tenderness (Tenderness over epigastrium). There is no guarding or rebound.   Musculoskeletal:         General: Swelling (Bilateral lower extremity swelling.  Patient family reports that she receives physical therapy for this and that the swelling goes \"up-and-down.\") present.   Skin:     General: Skin is

## 2025-07-18 NOTE — H&P
History & Physical    Patient: Jacqueline Barnes MRN: [id number]  [facility]: [id number]    YOB: 1960  Age: 64 y.o.  Sex: female             DOA: 7/18/2025    Chief Complaint:   Chief Complaint   Patient presents with    Hypotension    Dizziness          HPI:    Jacqueline Barnes is a 64 y.o.  female with a history of dementia, CHF, COPD, and seizure disorder, who presented to the ED after her PCP recommended she come in due to low blood pressure in the 90s/60s. Her daughter reports that over the past few days, the patient has been more tired, fatigued, and has a worsening altered mental status, a rapid departure from her baseline. She does not recognize her daughter or know her own name. The patient was diagnosed with a UTI last week, but her daughter, who prepares her medications, reports she has not taken the prescribed medication. A urinalysis was positive for leukocyte esterase and bacteria.    Upon arrival, the patient's blood pressure was 96/54 mmHg. Despite receiving IV fluids, her mental status remained altered, though her blood pressure responded and is now in the 100s. She appears dry on examination and complains of thirst. A CT of the head was stable with no acute abnormalities. A chest x-ray showed patchy right basilar opacities and a small right pleural effusion. Her initial potassium was critically low at 2.5 mEq/L and was repleted with oral and IV potassium. Troponin was elevated at 32.9, which will be trended. She was started on empirical Rocephin and doxycycline for a suspected UTI and possible pneumonia, as she has a penicillin allergy has prolonged QTc on EKG. Given her low blood pressure and signs of dehydration, her home medications including Lasix, Aldactone, and antihypertensives were held. Gabapentin was also held due to her altered mental status. She was admitted to the hospitalist service for altered mental

## 2025-07-19 ENCOUNTER — APPOINTMENT (OUTPATIENT)
Facility: HOSPITAL | Age: 65
DRG: 871 | End: 2025-07-19
Attending: STUDENT IN AN ORGANIZED HEALTH CARE EDUCATION/TRAINING PROGRAM
Payer: MEDICARE

## 2025-07-19 ENCOUNTER — APPOINTMENT (OUTPATIENT)
Facility: HOSPITAL | Age: 65
DRG: 871 | End: 2025-07-19
Payer: MEDICARE

## 2025-07-19 PROBLEM — J18.9 COMMUNITY ACQUIRED PNEUMONIA OF RIGHT LOWER LOBE OF LUNG: Status: ACTIVE | Noted: 2025-07-19

## 2025-07-19 PROBLEM — K59.09 OTHER CONSTIPATION: Status: ACTIVE | Noted: 2025-07-19

## 2025-07-19 PROBLEM — R13.12 OROPHARYNGEAL DYSPHAGIA: Status: ACTIVE | Noted: 2025-07-19

## 2025-07-19 PROBLEM — E78.5 DYSLIPIDEMIA: Status: ACTIVE | Noted: 2018-03-05

## 2025-07-19 PROBLEM — A41.9 SEPSIS (HCC): Status: RESOLVED | Noted: 2025-07-18 | Resolved: 2025-07-19

## 2025-07-19 PROBLEM — R94.31 PROLONGED QT INTERVAL: Status: ACTIVE | Noted: 2025-07-19

## 2025-07-19 PROBLEM — E87.6 HYPOKALEMIA: Status: ACTIVE | Noted: 2025-07-19

## 2025-07-19 PROBLEM — N17.9 ACUTE KIDNEY INJURY: Status: ACTIVE | Noted: 2025-07-19

## 2025-07-19 PROBLEM — N39.0 COMPLICATED UTI (URINARY TRACT INFECTION): Status: RESOLVED | Noted: 2025-07-18 | Resolved: 2025-07-19

## 2025-07-19 PROBLEM — J44.9 COPD WITHOUT EXACERBATION (HCC): Status: ACTIVE | Noted: 2022-06-22

## 2025-07-19 LAB
ANION GAP SERPL CALC-SCNC: 16 MMOL/L (ref 3–18)
B PERT DNA SPEC QL NAA+PROBE: NOT DETECTED
BASOPHILS # BLD: 0.03 K/UL (ref 0–0.1)
BASOPHILS NFR BLD: 0.6 % (ref 0–2)
BORDETELLA PARAPERTUSSIS BY PCR: NOT DETECTED
BUN SERPL-MCNC: 56 MG/DL (ref 6–23)
BUN/CREAT SERPL: 34 (ref 12–20)
C PNEUM DNA SPEC QL NAA+PROBE: NOT DETECTED
CALCIUM SERPL-MCNC: 9.1 MG/DL (ref 8.5–10.1)
CHLORIDE SERPL-SCNC: 92 MMOL/L (ref 98–107)
CHOLEST SERPL-MCNC: 175 MG/DL
CO2 SERPL-SCNC: 29 MMOL/L (ref 21–32)
CREAT SERPL-MCNC: 1.67 MG/DL (ref 0.6–1.3)
DIFFERENTIAL METHOD BLD: ABNORMAL
ECHO BSA: 2.27 M2
ECHO EST RA PRESSURE: 3 MMHG
ECHO LV EDV A2C: 57 ML
ECHO LV EDV A4C: 57 ML
ECHO LV EDV BP: 58 ML (ref 56–104)
ECHO LV EDV INDEX A4C: 26 ML/M2
ECHO LV EDV INDEX BP: 27 ML/M2
ECHO LV EDV NDEX A2C: 26 ML/M2
ECHO LV EF PHYSICIAN: 60 %
ECHO LV EJECTION FRACTION A2C: 64 %
ECHO LV EJECTION FRACTION A4C: 59 %
ECHO LV EJECTION FRACTION BIPLANE: 59 % (ref 55–100)
ECHO LV ESV A2C: 21 ML
ECHO LV ESV A4C: 23 ML
ECHO LV ESV BP: 24 ML (ref 19–49)
ECHO LV ESV INDEX A2C: 10 ML/M2
ECHO LV ESV INDEX A4C: 11 ML/M2
ECHO LV ESV INDEX BP: 11 ML/M2
ECHO LV FRACTIONAL SHORTENING: 28 % (ref 28–44)
ECHO LV INTERNAL DIMENSION DIASTOLE INDEX: 2.29 CM/M2
ECHO LV INTERNAL DIMENSION DIASTOLIC: 5 CM (ref 3.9–5.3)
ECHO LV INTERNAL DIMENSION SYSTOLIC INDEX: 1.65 CM/M2
ECHO LV INTERNAL DIMENSION SYSTOLIC: 3.6 CM
ECHO LV IVSD: 0.9 CM (ref 0.6–0.9)
ECHO LV MASS 2D: 158.2 G (ref 67–162)
ECHO LV MASS INDEX 2D: 72.6 G/M2 (ref 43–95)
ECHO LV POSTERIOR WALL DIASTOLIC: 0.9 CM (ref 0.6–0.9)
ECHO LV RELATIVE WALL THICKNESS RATIO: 0.36
ECHO PV MAX VELOCITY: 1.2 M/S
ECHO PV PEAK GRADIENT: 6 MMHG
ECHO RIGHT VENTRICULAR SYSTOLIC PRESSURE (RVSP): 35 MMHG
ECHO RV BASAL DIMENSION: 3.4 CM
ECHO RV TAPSE: 1.8 CM (ref 1.7–?)
ECHO TV REGURGITANT MAX VELOCITY: 2.84 M/S
ECHO TV REGURGITANT PEAK GRADIENT: 32 MMHG
EOSINOPHIL # BLD: 0.08 K/UL (ref 0–0.4)
EOSINOPHIL NFR BLD: 1.7 % (ref 0–5)
ERYTHROCYTE [DISTWIDTH] IN BLOOD BY AUTOMATED COUNT: 13.2 % (ref 11.6–14.5)
EST. AVERAGE GLUCOSE BLD GHB EST-MCNC: 130 MG/DL
FERRITIN SERPL-MCNC: 79 NG/ML (ref 13–400)
FLUAV SUBTYP SPEC NAA+PROBE: NOT DETECTED
FLUBV RNA SPEC QL NAA+PROBE: NOT DETECTED
GLUCOSE SERPL-MCNC: 172 MG/DL (ref 74–108)
HADV DNA SPEC QL NAA+PROBE: NOT DETECTED
HBA1C MFR BLD: 6.2 % (ref 4.2–5.6)
HCOV 229E RNA SPEC QL NAA+PROBE: NOT DETECTED
HCOV HKU1 RNA SPEC QL NAA+PROBE: NOT DETECTED
HCOV NL63 RNA SPEC QL NAA+PROBE: NOT DETECTED
HCOV OC43 RNA SPEC QL NAA+PROBE: NOT DETECTED
HCT VFR BLD AUTO: 38.2 % (ref 35–45)
HDLC SERPL-MCNC: 30 MG/DL (ref 40–60)
HDLC SERPL: 5.8 (ref 0–5)
HGB BLD-MCNC: 13 G/DL (ref 12–16)
HMPV RNA SPEC QL NAA+PROBE: NOT DETECTED
HPIV1 RNA SPEC QL NAA+PROBE: NOT DETECTED
HPIV2 RNA SPEC QL NAA+PROBE: NOT DETECTED
HPIV3 RNA SPEC QL NAA+PROBE: NOT DETECTED
HPIV4 RNA SPEC QL NAA+PROBE: NOT DETECTED
IMM GRANULOCYTES # BLD AUTO: 0.01 K/UL (ref 0–0.04)
IMM GRANULOCYTES NFR BLD AUTO: 0.2 % (ref 0–0.5)
IRON SATN MFR SERPL: 16 %
IRON SERPL-MCNC: 47 UG/DL (ref 50–175)
L PNEUMO AG UR QL: NEGATIVE
LDLC SERPL CALC-MCNC: 105 MG/DL (ref 0–100)
LV EF: 55 ML
LYMPHOCYTES # BLD: 1.43 K/UL (ref 0.9–3.6)
LYMPHOCYTES NFR BLD: 30.2 % (ref 21–52)
M PNEUMO DNA SPEC QL NAA+PROBE: NOT DETECTED
MAGNESIUM SERPL-MCNC: 2.2 MG/DL (ref 1.6–2.6)
MCH RBC QN AUTO: 29.2 PG (ref 24–34)
MCHC RBC AUTO-ENTMCNC: 34 G/DL (ref 31–37)
MCV RBC AUTO: 85.8 FL (ref 78–100)
MONOCYTES # BLD: 0.59 K/UL (ref 0.05–1.2)
MONOCYTES NFR BLD: 12.4 % (ref 3–10)
NEGATIVE CONTROL: NEGATIVE
NEUTS SEG # BLD: 2.6 K/UL (ref 1.8–8)
NEUTS SEG NFR BLD: 54.9 % (ref 40–73)
NRBC # BLD: 0 K/UL (ref 0–0.01)
NRBC BLD-RTO: 0 PER 100 WBC
PH, URINE: 6 (ref 4.6–8)
PHOSPHATE SERPL-MCNC: 3.9 MG/DL (ref 2.5–4.9)
PLATELET # BLD AUTO: 297 K/UL (ref 135–420)
PMV BLD AUTO: 9.7 FL (ref 9.2–11.8)
POSITIVE CONTROL: POSITIVE
POTASSIUM SERPL-SCNC: 2.9 MMOL/L (ref 3.5–5.5)
POTASSIUM SERPL-SCNC: 3 MMOL/L (ref 3.5–5.5)
RBC # BLD AUTO: 4.45 M/UL (ref 4.2–5.3)
RSV RNA SPEC QL NAA+PROBE: NOT DETECTED
RV+EV RNA SPEC QL NAA+PROBE: NOT DETECTED
S PNEUM AG UR QL IA.RAPID: NEGATIVE
SARS-COV-2 RNA RESP QL NAA+PROBE: NOT DETECTED
SODIUM SERPL-SCNC: 136 MMOL/L (ref 136–145)
T4 FREE SERPL-MCNC: 1.2 NG/DL (ref 0.9–1.7)
TIBC SERPL-MCNC: 294 UG/DL (ref 250–450)
TRIGL SERPL-MCNC: 200 MG/DL (ref 0–150)
TROPONIN T SERPL HS-MCNC: 22.4 NG/L (ref 0–14)
TROPONIN T SERPL HS-MCNC: 24.4 NG/L (ref 0–14)
TSH, 3RD GENERATION: 1.78 UIU/ML (ref 0.27–4.2)
UIBC SERPL-MCNC: 247 UG/DL (ref 112–347)
VLDLC SERPL CALC-MCNC: 40 MG/DL
WBC # BLD AUTO: 4.7 K/UL (ref 4.6–13.2)

## 2025-07-19 PROCEDURE — 6360000002 HC RX W HCPCS: Performed by: STUDENT IN AN ORGANIZED HEALTH CARE EDUCATION/TRAINING PROGRAM

## 2025-07-19 PROCEDURE — 6370000000 HC RX 637 (ALT 250 FOR IP): Performed by: STUDENT IN AN ORGANIZED HEALTH CARE EDUCATION/TRAINING PROGRAM

## 2025-07-19 PROCEDURE — 83735 ASSAY OF MAGNESIUM: CPT

## 2025-07-19 PROCEDURE — 83036 HEMOGLOBIN GLYCOSYLATED A1C: CPT

## 2025-07-19 PROCEDURE — 84132 ASSAY OF SERUM POTASSIUM: CPT

## 2025-07-19 PROCEDURE — 74018 RADEX ABDOMEN 1 VIEW: CPT

## 2025-07-19 PROCEDURE — 82728 ASSAY OF FERRITIN: CPT

## 2025-07-19 PROCEDURE — 84484 ASSAY OF TROPONIN QUANT: CPT

## 2025-07-19 PROCEDURE — 94761 N-INVAS EAR/PLS OXIMETRY MLT: CPT

## 2025-07-19 PROCEDURE — 99232 SBSQ HOSP IP/OBS MODERATE 35: CPT | Performed by: STUDENT IN AN ORGANIZED HEALTH CARE EDUCATION/TRAINING PROGRAM

## 2025-07-19 PROCEDURE — 94640 AIRWAY INHALATION TREATMENT: CPT

## 2025-07-19 PROCEDURE — 84439 ASSAY OF FREE THYROXINE: CPT

## 2025-07-19 PROCEDURE — 1100000003 HC PRIVATE W/ TELEMETRY

## 2025-07-19 PROCEDURE — 83550 IRON BINDING TEST: CPT

## 2025-07-19 PROCEDURE — 2580000003 HC RX 258: Performed by: STUDENT IN AN ORGANIZED HEALTH CARE EDUCATION/TRAINING PROGRAM

## 2025-07-19 PROCEDURE — 2500000003 HC RX 250 WO HCPCS: Performed by: STUDENT IN AN ORGANIZED HEALTH CARE EDUCATION/TRAINING PROGRAM

## 2025-07-19 PROCEDURE — 0202U NFCT DS 22 TRGT SARS-COV-2: CPT

## 2025-07-19 PROCEDURE — 80048 BASIC METABOLIC PNL TOTAL CA: CPT

## 2025-07-19 PROCEDURE — 93308 TTE F-UP OR LMTD: CPT

## 2025-07-19 PROCEDURE — 84100 ASSAY OF PHOSPHORUS: CPT

## 2025-07-19 PROCEDURE — 87899 AGENT NOS ASSAY W/OPTIC: CPT

## 2025-07-19 PROCEDURE — 84443 ASSAY THYROID STIM HORMONE: CPT

## 2025-07-19 PROCEDURE — 80061 LIPID PANEL: CPT

## 2025-07-19 PROCEDURE — 85025 COMPLETE CBC W/AUTO DIFF WBC: CPT

## 2025-07-19 PROCEDURE — 83540 ASSAY OF IRON: CPT

## 2025-07-19 PROCEDURE — 36415 COLL VENOUS BLD VENIPUNCTURE: CPT

## 2025-07-19 RX ORDER — BISACODYL 10 MG
10 SUPPOSITORY, RECTAL RECTAL DAILY PRN
Status: DISCONTINUED | OUTPATIENT
Start: 2025-07-19 | End: 2025-07-24 | Stop reason: HOSPADM

## 2025-07-19 RX ORDER — POLYETHYLENE GLYCOL 3350 17 G/17G
17 POWDER, FOR SOLUTION ORAL DAILY
Status: DISCONTINUED | OUTPATIENT
Start: 2025-07-19 | End: 2025-07-24 | Stop reason: HOSPADM

## 2025-07-19 RX ORDER — METRONIDAZOLE 500 MG/100ML
500 INJECTION, SOLUTION INTRAVENOUS EVERY 8 HOURS
Status: COMPLETED | OUTPATIENT
Start: 2025-07-19 | End: 2025-07-24

## 2025-07-19 RX ORDER — SODIUM CHLORIDE AND POTASSIUM CHLORIDE 300; 900 MG/100ML; MG/100ML
INJECTION, SOLUTION INTRAVENOUS CONTINUOUS
Status: DISCONTINUED | OUTPATIENT
Start: 2025-07-19 | End: 2025-07-21

## 2025-07-19 RX ADMIN — FLUTICASONE PROPIONATE 1 SPRAY: 50 SPRAY, METERED NASAL at 20:29

## 2025-07-19 RX ADMIN — CEFTRIAXONE 1000 MG: 1 INJECTION, POWDER, FOR SOLUTION INTRAMUSCULAR; INTRAVENOUS at 20:32

## 2025-07-19 RX ADMIN — ONDANSETRON 4 MG: 2 INJECTION, SOLUTION INTRAMUSCULAR; INTRAVENOUS at 20:32

## 2025-07-19 RX ADMIN — MONTELUKAST 10 MG: 10 TABLET, FILM COATED ORAL at 19:35

## 2025-07-19 RX ADMIN — MONTELUKAST 10 MG: 10 TABLET, FILM COATED ORAL at 20:30

## 2025-07-19 RX ADMIN — POTASSIUM CHLORIDE 10 MEQ: 7.46 INJECTION, SOLUTION INTRAVENOUS at 03:44

## 2025-07-19 RX ADMIN — HEPARIN SODIUM 5000 UNITS: 5000 INJECTION INTRAVENOUS; SUBCUTANEOUS at 21:36

## 2025-07-19 RX ADMIN — SODIUM CHLORIDE, PRESERVATIVE FREE 10 ML: 5 INJECTION INTRAVENOUS at 19:35

## 2025-07-19 RX ADMIN — ARFORMOTEROL TARTRATE: 15 SOLUTION RESPIRATORY (INHALATION) at 09:30

## 2025-07-19 RX ADMIN — HEPARIN SODIUM 5000 UNITS: 5000 INJECTION INTRAVENOUS; SUBCUTANEOUS at 15:15

## 2025-07-19 RX ADMIN — SODIUM CHLORIDE, SODIUM LACTATE, POTASSIUM CHLORIDE, AND CALCIUM CHLORIDE: .6; .31; .03; .02 INJECTION, SOLUTION INTRAVENOUS at 06:51

## 2025-07-19 RX ADMIN — DOCUSATE SODIUM AND SENNOSIDES 1 TABLET: 8.6; 5 TABLET, FILM COATED ORAL at 09:50

## 2025-07-19 RX ADMIN — POTASSIUM CHLORIDE 10 MEQ: 7.46 INJECTION, SOLUTION INTRAVENOUS at 04:49

## 2025-07-19 RX ADMIN — DOCUSATE SODIUM AND SENNOSIDES 1 TABLET: 8.6; 5 TABLET, FILM COATED ORAL at 20:30

## 2025-07-19 RX ADMIN — DOXYCYCLINE 100 MG: 100 INJECTION, POWDER, LYOPHILIZED, FOR SOLUTION INTRAVENOUS at 11:41

## 2025-07-19 RX ADMIN — POTASSIUM CHLORIDE AND SODIUM CHLORIDE: 900; 300 INJECTION, SOLUTION INTRAVENOUS at 15:27

## 2025-07-19 RX ADMIN — FLUTICASONE PROPIONATE 1 SPRAY: 50 SPRAY, METERED NASAL at 19:35

## 2025-07-19 RX ADMIN — ONDANSETRON 4 MG: 2 INJECTION, SOLUTION INTRAMUSCULAR; INTRAVENOUS at 07:53

## 2025-07-19 RX ADMIN — POTASSIUM CHLORIDE 10 MEQ: 7.46 INJECTION, SOLUTION INTRAVENOUS at 00:14

## 2025-07-19 RX ADMIN — FERROUS SULFATE TAB 325 MG (65 MG ELEMENTAL FE) 325 MG: 325 (65 FE) TAB at 09:50

## 2025-07-19 RX ADMIN — POTASSIUM CHLORIDE 10 MEQ: 7.46 INJECTION, SOLUTION INTRAVENOUS at 06:51

## 2025-07-19 RX ADMIN — POTASSIUM CHLORIDE 10 MEQ: 7.46 INJECTION, SOLUTION INTRAVENOUS at 05:50

## 2025-07-19 RX ADMIN — SODIUM CHLORIDE, PRESERVATIVE FREE 10 ML: 5 INJECTION INTRAVENOUS at 09:51

## 2025-07-19 RX ADMIN — ONDANSETRON 4 MG: 2 INJECTION, SOLUTION INTRAMUSCULAR; INTRAVENOUS at 15:17

## 2025-07-19 RX ADMIN — DOCUSATE SODIUM AND SENNOSIDES 1 TABLET: 8.6; 5 TABLET, FILM COATED ORAL at 19:35

## 2025-07-19 RX ADMIN — METRONIDAZOLE 500 MG: 500 INJECTION, SOLUTION INTRAVENOUS at 18:42

## 2025-07-19 RX ADMIN — PANTOPRAZOLE SODIUM 40 MG: 40 TABLET, DELAYED RELEASE ORAL at 06:19

## 2025-07-19 RX ADMIN — SODIUM CHLORIDE, PRESERVATIVE FREE 10 ML: 5 INJECTION INTRAVENOUS at 15:20

## 2025-07-19 RX ADMIN — POTASSIUM CHLORIDE 10 MEQ: 7.46 INJECTION, SOLUTION INTRAVENOUS at 08:29

## 2025-07-19 RX ADMIN — BISACODYL 10 MG: 10 SUPPOSITORY RECTAL at 17:13

## 2025-07-19 RX ADMIN — HEPARIN SODIUM 5000 UNITS: 5000 INJECTION INTRAVENOUS; SUBCUTANEOUS at 06:19

## 2025-07-19 RX ADMIN — POTASSIUM CHLORIDE 10 MEQ: 7.46 INJECTION, SOLUTION INTRAVENOUS at 09:54

## 2025-07-19 NOTE — ED NOTES
TRANSFER - OUT REPORT:    Verbal report given to JAIME Iraheta on Jacqueline Barnes  being transferred to [facility] for routine progression of patient care       Report consisted of patient's Situation, Background, Assessment and   Recommendations(SBAR).     Information from the following report(s) Nurse Handoff Report, ED Encounter Summary, ED SBAR, MAR, and Neuro Assessment was reviewed with the receiving nurse.        Lines:   Peripheral IV 07/18/25 Left Antecubital (Active)        Opportunity for questions and clarification was provided.      Patient transported with:  Registered Nurse

## 2025-07-20 ENCOUNTER — APPOINTMENT (OUTPATIENT)
Facility: HOSPITAL | Age: 65
DRG: 871 | End: 2025-07-20
Attending: STUDENT IN AN ORGANIZED HEALTH CARE EDUCATION/TRAINING PROGRAM
Payer: MEDICARE

## 2025-07-20 PROBLEM — F44.5 PSYCHOGENIC NONEPILEPTIC SEIZURE: Status: ACTIVE | Noted: 2025-07-20

## 2025-07-20 LAB
ALBUMIN SERPL-MCNC: 3.2 G/DL (ref 3.4–5)
ALBUMIN/GLOB SERPL: 0.9 (ref 0.8–1.7)
ALP SERPL-CCNC: 81 U/L (ref 45–117)
ALT SERPL-CCNC: 16 U/L (ref 10–35)
ANION GAP SERPL CALC-SCNC: 14 MMOL/L (ref 3–18)
AST SERPL-CCNC: 25 U/L (ref 10–38)
BACTERIA SPEC CULT: NORMAL
BASOPHILS # BLD: 0.03 K/UL (ref 0–0.1)
BASOPHILS NFR BLD: 0.4 % (ref 0–2)
BILIRUB SERPL-MCNC: 0.5 MG/DL (ref 0.2–1)
BUN SERPL-MCNC: 34 MG/DL (ref 6–23)
BUN/CREAT SERPL: 34 (ref 12–20)
CALCIUM SERPL-MCNC: 9.7 MG/DL (ref 8.5–10.1)
CHLORIDE SERPL-SCNC: 100 MMOL/L (ref 98–107)
CO2 SERPL-SCNC: 25 MMOL/L (ref 21–32)
CREAT SERPL-MCNC: 1 MG/DL (ref 0.6–1.3)
DIFFERENTIAL METHOD BLD: ABNORMAL
EKG ATRIAL RATE: 104 BPM
EKG DIAGNOSIS: NORMAL
EKG Q-T INTERVAL: 428 MS
EKG Q-T INTERVAL: 438 MS
EKG Q-T INTERVAL: 502 MS
EKG QRS DURATION: 86 MS
EKG QRS DURATION: 90 MS
EKG QRS DURATION: 90 MS
EKG QTC CALCULATION (BAZETT): 515 MS
EKG QTC CALCULATION (BAZETT): 553 MS
EKG QTC CALCULATION (BAZETT): 575 MS
EKG R AXIS: 12 DEGREES
EKG R AXIS: 18 DEGREES
EKG R AXIS: 65 DEGREES
EKG T AXIS: -14 DEGREES
EKG T AXIS: -20 DEGREES
EKG T AXIS: -22 DEGREES
EKG VENTRICULAR RATE: 79 BPM
EKG VENTRICULAR RATE: 87 BPM
EKG VENTRICULAR RATE: 96 BPM
EOSINOPHIL # BLD: 0.05 K/UL (ref 0–0.4)
EOSINOPHIL NFR BLD: 0.7 % (ref 0–5)
ERYTHROCYTE [DISTWIDTH] IN BLOOD BY AUTOMATED COUNT: 13.4 % (ref 11.6–14.5)
GLOBULIN SER CALC-MCNC: 3.5 G/DL (ref 2–4)
GLUCOSE BLD STRIP.AUTO-MCNC: 91 MG/DL (ref 70–110)
GLUCOSE SERPL-MCNC: 121 MG/DL (ref 74–108)
HCT VFR BLD AUTO: 40.8 % (ref 35–45)
HGB BLD-MCNC: 13.8 G/DL (ref 12–16)
IMM GRANULOCYTES # BLD AUTO: 0.03 K/UL (ref 0–0.04)
IMM GRANULOCYTES NFR BLD AUTO: 0.4 % (ref 0–0.5)
LIPASE SERPL-CCNC: 22 U/L (ref 13–75)
LYMPHOCYTES # BLD: 0.82 K/UL (ref 0.9–3.6)
LYMPHOCYTES NFR BLD: 11.4 % (ref 21–52)
MAGNESIUM SERPL-MCNC: 2.2 MG/DL (ref 1.6–2.6)
MCH RBC QN AUTO: 30.3 PG (ref 24–34)
MCHC RBC AUTO-ENTMCNC: 33.8 G/DL (ref 31–37)
MCV RBC AUTO: 89.5 FL (ref 78–100)
MONOCYTES # BLD: 0.75 K/UL (ref 0.05–1.2)
MONOCYTES NFR BLD: 10.4 % (ref 3–10)
NEUTS SEG # BLD: 5.5 K/UL (ref 1.8–8)
NEUTS SEG NFR BLD: 76.7 % (ref 40–73)
NRBC # BLD: 0 K/UL (ref 0–0.01)
NRBC BLD-RTO: 0 PER 100 WBC
PLATELET # BLD AUTO: 249 K/UL (ref 135–420)
PMV BLD AUTO: 9.7 FL (ref 9.2–11.8)
POTASSIUM SERPL-SCNC: 3 MMOL/L (ref 3.5–5.5)
PROT SERPL-MCNC: 6.8 G/DL (ref 6.4–8.2)
RBC # BLD AUTO: 4.56 M/UL (ref 4.2–5.3)
SERVICE CMNT-IMP: NORMAL
SODIUM SERPL-SCNC: 139 MMOL/L (ref 136–145)
WBC # BLD AUTO: 7.2 K/UL (ref 4.6–13.2)

## 2025-07-20 PROCEDURE — 6360000002 HC RX W HCPCS: Performed by: STUDENT IN AN ORGANIZED HEALTH CARE EDUCATION/TRAINING PROGRAM

## 2025-07-20 PROCEDURE — 94761 N-INVAS EAR/PLS OXIMETRY MLT: CPT

## 2025-07-20 PROCEDURE — 1100000003 HC PRIVATE W/ TELEMETRY

## 2025-07-20 PROCEDURE — 85025 COMPLETE CBC W/AUTO DIFF WBC: CPT

## 2025-07-20 PROCEDURE — 36415 COLL VENOUS BLD VENIPUNCTURE: CPT

## 2025-07-20 PROCEDURE — 83690 ASSAY OF LIPASE: CPT

## 2025-07-20 PROCEDURE — 93010 ELECTROCARDIOGRAM REPORT: CPT | Performed by: INTERNAL MEDICINE

## 2025-07-20 PROCEDURE — 6370000000 HC RX 637 (ALT 250 FOR IP): Performed by: STUDENT IN AN ORGANIZED HEALTH CARE EDUCATION/TRAINING PROGRAM

## 2025-07-20 PROCEDURE — 94640 AIRWAY INHALATION TREATMENT: CPT

## 2025-07-20 PROCEDURE — 82962 GLUCOSE BLOOD TEST: CPT

## 2025-07-20 PROCEDURE — 99233 SBSQ HOSP IP/OBS HIGH 50: CPT | Performed by: STUDENT IN AN ORGANIZED HEALTH CARE EDUCATION/TRAINING PROGRAM

## 2025-07-20 PROCEDURE — 2500000003 HC RX 250 WO HCPCS: Performed by: STUDENT IN AN ORGANIZED HEALTH CARE EDUCATION/TRAINING PROGRAM

## 2025-07-20 PROCEDURE — 74176 CT ABD & PELVIS W/O CONTRAST: CPT

## 2025-07-20 PROCEDURE — 83735 ASSAY OF MAGNESIUM: CPT

## 2025-07-20 PROCEDURE — 80053 COMPREHEN METABOLIC PANEL: CPT

## 2025-07-20 RX ORDER — PANTOPRAZOLE SODIUM 40 MG/1
40 TABLET, DELAYED RELEASE ORAL
Status: DISCONTINUED | OUTPATIENT
Start: 2025-07-20 | End: 2025-07-20

## 2025-07-20 RX ORDER — OXYCODONE HYDROCHLORIDE 5 MG/1
2.5 TABLET ORAL EVERY 4 HOURS PRN
Refills: 0 | Status: DISCONTINUED | OUTPATIENT
Start: 2025-07-20 | End: 2025-07-24 | Stop reason: HOSPADM

## 2025-07-20 RX ADMIN — HEPARIN SODIUM 5000 UNITS: 5000 INJECTION INTRAVENOUS; SUBCUTANEOUS at 21:46

## 2025-07-20 RX ADMIN — CEFTRIAXONE 1000 MG: 1 INJECTION, POWDER, FOR SOLUTION INTRAMUSCULAR; INTRAVENOUS at 20:40

## 2025-07-20 RX ADMIN — ALBUTEROL SULFATE 2.5 MG: 2.5 SOLUTION RESPIRATORY (INHALATION) at 20:48

## 2025-07-20 RX ADMIN — PANTOPRAZOLE SODIUM 40 MG: 40 TABLET, DELAYED RELEASE ORAL at 05:56

## 2025-07-20 RX ADMIN — SODIUM CHLORIDE 40 MG: 9 INJECTION INTRAMUSCULAR; INTRAVENOUS; SUBCUTANEOUS at 17:10

## 2025-07-20 RX ADMIN — POTASSIUM CHLORIDE AND SODIUM CHLORIDE: 900; 300 INJECTION, SOLUTION INTRAVENOUS at 14:08

## 2025-07-20 RX ADMIN — HYDROMORPHONE HYDROCHLORIDE 0.5 MG: 1 INJECTION, SOLUTION INTRAMUSCULAR; INTRAVENOUS; SUBCUTANEOUS at 19:33

## 2025-07-20 RX ADMIN — METRONIDAZOLE 500 MG: 500 INJECTION, SOLUTION INTRAVENOUS at 01:55

## 2025-07-20 RX ADMIN — METRONIDAZOLE 500 MG: 500 INJECTION, SOLUTION INTRAVENOUS at 17:17

## 2025-07-20 RX ADMIN — FLUTICASONE PROPIONATE 1 SPRAY: 50 SPRAY, METERED NASAL at 10:04

## 2025-07-20 RX ADMIN — HYDROMORPHONE HYDROCHLORIDE 0.5 MG: 1 INJECTION, SOLUTION INTRAMUSCULAR; INTRAVENOUS; SUBCUTANEOUS at 14:55

## 2025-07-20 RX ADMIN — HEPARIN SODIUM 5000 UNITS: 5000 INJECTION INTRAVENOUS; SUBCUTANEOUS at 14:03

## 2025-07-20 RX ADMIN — METRONIDAZOLE 500 MG: 500 INJECTION, SOLUTION INTRAVENOUS at 10:19

## 2025-07-20 RX ADMIN — ALBUTEROL SULFATE 2.5 MG: 2.5 SOLUTION RESPIRATORY (INHALATION) at 17:08

## 2025-07-20 RX ADMIN — SODIUM CHLORIDE, PRESERVATIVE FREE 10 ML: 5 INJECTION INTRAVENOUS at 10:20

## 2025-07-20 RX ADMIN — HEPARIN SODIUM 5000 UNITS: 5000 INJECTION INTRAVENOUS; SUBCUTANEOUS at 05:56

## 2025-07-20 RX ADMIN — ARFORMOTEROL TARTRATE: 15 SOLUTION RESPIRATORY (INHALATION) at 20:45

## 2025-07-20 ASSESSMENT — PAIN SCALES - GENERAL
PAINLEVEL_OUTOF10: 0
PAINLEVEL_OUTOF10: 0

## 2025-07-21 ENCOUNTER — APPOINTMENT (OUTPATIENT)
Facility: HOSPITAL | Age: 65
DRG: 871 | End: 2025-07-21
Payer: MEDICARE

## 2025-07-21 ENCOUNTER — ANESTHESIA (OUTPATIENT)
Facility: HOSPITAL | Age: 65
DRG: 871 | End: 2025-07-21
Payer: MEDICARE

## 2025-07-21 ENCOUNTER — ANESTHESIA EVENT (OUTPATIENT)
Facility: HOSPITAL | Age: 65
DRG: 871 | End: 2025-07-21
Payer: MEDICARE

## 2025-07-21 PROBLEM — N17.9 SEPSIS WITH ACUTE RENAL FAILURE (HCC): Status: ACTIVE | Noted: 2025-07-18

## 2025-07-21 PROBLEM — R65.20 SEPSIS WITH ACUTE RENAL FAILURE (HCC): Status: ACTIVE | Noted: 2025-07-18

## 2025-07-21 LAB
ALBUMIN SERPL-MCNC: 3.1 G/DL (ref 3.4–5)
ALBUMIN/GLOB SERPL: 1 (ref 0.8–1.7)
ALP SERPL-CCNC: 75 U/L (ref 45–117)
ALT SERPL-CCNC: 16 U/L (ref 10–35)
ANION GAP SERPL CALC-SCNC: 11 MMOL/L (ref 3–18)
AST SERPL-CCNC: 24 U/L (ref 10–38)
BASOPHILS # BLD: 0.02 K/UL (ref 0–0.1)
BASOPHILS NFR BLD: 0.5 % (ref 0–2)
BILIRUB SERPL-MCNC: 0.5 MG/DL (ref 0.2–1)
BUN SERPL-MCNC: 21 MG/DL (ref 6–23)
BUN/CREAT SERPL: 25 (ref 12–20)
CALCIUM SERPL-MCNC: 9.7 MG/DL (ref 8.5–10.1)
CHLORIDE SERPL-SCNC: 103 MMOL/L (ref 98–107)
CO2 SERPL-SCNC: 29 MMOL/L (ref 21–32)
CREAT SERPL-MCNC: 0.83 MG/DL (ref 0.6–1.3)
DIFFERENTIAL METHOD BLD: ABNORMAL
EOSINOPHIL # BLD: 0.1 K/UL (ref 0–0.4)
EOSINOPHIL NFR BLD: 2.7 % (ref 0–5)
ERYTHROCYTE [DISTWIDTH] IN BLOOD BY AUTOMATED COUNT: 13.3 % (ref 11.6–14.5)
GLOBULIN SER CALC-MCNC: 3.2 G/DL (ref 2–4)
GLUCOSE BLD STRIP.AUTO-MCNC: 102 MG/DL (ref 70–110)
GLUCOSE SERPL-MCNC: 104 MG/DL (ref 74–108)
HCT VFR BLD AUTO: 36.7 % (ref 35–45)
HGB BLD-MCNC: 12 G/DL (ref 12–16)
IMM GRANULOCYTES # BLD AUTO: 0.01 K/UL (ref 0–0.04)
IMM GRANULOCYTES NFR BLD AUTO: 0.3 % (ref 0–0.5)
LYMPHOCYTES # BLD: 1.17 K/UL (ref 0.9–3.6)
LYMPHOCYTES NFR BLD: 32.1 % (ref 21–52)
MCH RBC QN AUTO: 29.7 PG (ref 24–34)
MCHC RBC AUTO-ENTMCNC: 32.7 G/DL (ref 31–37)
MCV RBC AUTO: 90.8 FL (ref 78–100)
MONOCYTES # BLD: 0.54 K/UL (ref 0.05–1.2)
MONOCYTES NFR BLD: 14.8 % (ref 3–10)
NEUTS SEG # BLD: 1.81 K/UL (ref 1.8–8)
NEUTS SEG NFR BLD: 49.6 % (ref 40–73)
NRBC # BLD: 0 K/UL (ref 0–0.01)
NRBC BLD-RTO: 0 PER 100 WBC
NT PRO BNP: 488 PG/ML (ref 36–900)
PLATELET # BLD AUTO: 245 K/UL (ref 135–420)
PMV BLD AUTO: 9.4 FL (ref 9.2–11.8)
POTASSIUM SERPL-SCNC: 3.2 MMOL/L (ref 3.5–5.5)
PROT SERPL-MCNC: 6.3 G/DL (ref 6.4–8.2)
RBC # BLD AUTO: 4.04 M/UL (ref 4.2–5.3)
SODIUM SERPL-SCNC: 143 MMOL/L (ref 136–145)
WBC # BLD AUTO: 3.7 K/UL (ref 4.6–13.2)

## 2025-07-21 PROCEDURE — 80053 COMPREHEN METABOLIC PANEL: CPT

## 2025-07-21 PROCEDURE — 0DB58ZX EXCISION OF ESOPHAGUS, VIA NATURAL OR ARTIFICIAL OPENING ENDOSCOPIC, DIAGNOSTIC: ICD-10-PCS | Performed by: INTERNAL MEDICINE

## 2025-07-21 PROCEDURE — 99223 1ST HOSP IP/OBS HIGH 75: CPT | Performed by: SURGERY

## 2025-07-21 PROCEDURE — 3600007502: Performed by: INTERNAL MEDICINE

## 2025-07-21 PROCEDURE — 7100000010 HC PHASE II RECOVERY - FIRST 15 MIN: Performed by: INTERNAL MEDICINE

## 2025-07-21 PROCEDURE — 3600007512: Performed by: INTERNAL MEDICINE

## 2025-07-21 PROCEDURE — 82962 GLUCOSE BLOOD TEST: CPT

## 2025-07-21 PROCEDURE — 99233 SBSQ HOSP IP/OBS HIGH 50: CPT | Performed by: STUDENT IN AN ORGANIZED HEALTH CARE EDUCATION/TRAINING PROGRAM

## 2025-07-21 PROCEDURE — 1100000003 HC PRIVATE W/ TELEMETRY

## 2025-07-21 PROCEDURE — 6360000002 HC RX W HCPCS: Performed by: STUDENT IN AN ORGANIZED HEALTH CARE EDUCATION/TRAINING PROGRAM

## 2025-07-21 PROCEDURE — 2500000003 HC RX 250 WO HCPCS: Performed by: ANESTHESIOLOGY

## 2025-07-21 PROCEDURE — 88305 TISSUE EXAM BY PATHOLOGIST: CPT

## 2025-07-21 PROCEDURE — 94664 DEMO&/EVAL PT USE INHALER: CPT

## 2025-07-21 PROCEDURE — 3700000000 HC ANESTHESIA ATTENDED CARE: Performed by: INTERNAL MEDICINE

## 2025-07-21 PROCEDURE — 94761 N-INVAS EAR/PLS OXIMETRY MLT: CPT

## 2025-07-21 PROCEDURE — 94640 AIRWAY INHALATION TREATMENT: CPT

## 2025-07-21 PROCEDURE — 7100000000 HC PACU RECOVERY - FIRST 15 MIN: Performed by: INTERNAL MEDICINE

## 2025-07-21 PROCEDURE — 2580000003 HC RX 258: Performed by: ANESTHESIOLOGY

## 2025-07-21 PROCEDURE — 83880 ASSAY OF NATRIURETIC PEPTIDE: CPT

## 2025-07-21 PROCEDURE — 0D758ZZ DILATION OF ESOPHAGUS, VIA NATURAL OR ARTIFICIAL OPENING ENDOSCOPIC: ICD-10-PCS | Performed by: INTERNAL MEDICINE

## 2025-07-21 PROCEDURE — 3700000001 HC ADD 15 MINUTES (ANESTHESIA): Performed by: INTERNAL MEDICINE

## 2025-07-21 PROCEDURE — 6360000002 HC RX W HCPCS: Performed by: ANESTHESIOLOGY

## 2025-07-21 PROCEDURE — 85025 COMPLETE CBC W/AUTO DIFF WBC: CPT

## 2025-07-21 PROCEDURE — 71045 X-RAY EXAM CHEST 1 VIEW: CPT

## 2025-07-21 PROCEDURE — 2500000003 HC RX 250 WO HCPCS: Performed by: STUDENT IN AN ORGANIZED HEALTH CARE EDUCATION/TRAINING PROGRAM

## 2025-07-21 PROCEDURE — 36415 COLL VENOUS BLD VENIPUNCTURE: CPT

## 2025-07-21 PROCEDURE — 2709999900 HC NON-CHARGEABLE SUPPLY: Performed by: INTERNAL MEDICINE

## 2025-07-21 RX ORDER — PROPOFOL 10 MG/ML
INJECTION, EMULSION INTRAVENOUS
Status: DISCONTINUED | OUTPATIENT
Start: 2025-07-21 | End: 2025-07-21 | Stop reason: SDUPTHER

## 2025-07-21 RX ORDER — LIDOCAINE HYDROCHLORIDE 20 MG/ML
INJECTION, SOLUTION EPIDURAL; INFILTRATION; INTRACAUDAL; PERINEURAL
Status: DISCONTINUED | OUTPATIENT
Start: 2025-07-21 | End: 2025-07-21 | Stop reason: SDUPTHER

## 2025-07-21 RX ORDER — SODIUM CHLORIDE, SODIUM LACTATE, POTASSIUM CHLORIDE, CALCIUM CHLORIDE 600; 310; 30; 20 MG/100ML; MG/100ML; MG/100ML; MG/100ML
INJECTION, SOLUTION INTRAVENOUS
Status: DISCONTINUED | OUTPATIENT
Start: 2025-07-21 | End: 2025-07-21 | Stop reason: SDUPTHER

## 2025-07-21 RX ORDER — IPRATROPIUM BROMIDE AND ALBUTEROL SULFATE 2.5; .5 MG/3ML; MG/3ML
1 SOLUTION RESPIRATORY (INHALATION) EVERY 4 HOURS PRN
Status: DISCONTINUED | OUTPATIENT
Start: 2025-07-21 | End: 2025-07-24 | Stop reason: HOSPADM

## 2025-07-21 RX ORDER — DEXTROSE MONOHYDRATE, SODIUM CHLORIDE, AND POTASSIUM CHLORIDE 50; 2.98; 4.5 G/1000ML; G/1000ML; G/1000ML
INJECTION, SOLUTION INTRAVENOUS CONTINUOUS
Status: DISCONTINUED | OUTPATIENT
Start: 2025-07-21 | End: 2025-07-24 | Stop reason: HOSPADM

## 2025-07-21 RX ORDER — DEXMEDETOMIDINE HYDROCHLORIDE 100 UG/ML
INJECTION, SOLUTION INTRAVENOUS
Status: DISCONTINUED | OUTPATIENT
Start: 2025-07-21 | End: 2025-07-21 | Stop reason: SDUPTHER

## 2025-07-21 RX ADMIN — SODIUM CHLORIDE, SODIUM LACTATE, POTASSIUM CHLORIDE, AND CALCIUM CHLORIDE: 600; 310; 30; 20 INJECTION, SOLUTION INTRAVENOUS at 14:51

## 2025-07-21 RX ADMIN — METRONIDAZOLE 500 MG: 500 INJECTION, SOLUTION INTRAVENOUS at 01:43

## 2025-07-21 RX ADMIN — SODIUM CHLORIDE, PRESERVATIVE FREE 10 ML: 5 INJECTION INTRAVENOUS at 12:40

## 2025-07-21 RX ADMIN — DEXMEDETOMIDINE 6 MCG: 200 INJECTION, SOLUTION INTRAVENOUS at 15:04

## 2025-07-21 RX ADMIN — ARFORMOTEROL TARTRATE: 15 SOLUTION RESPIRATORY (INHALATION) at 21:05

## 2025-07-21 RX ADMIN — METRONIDAZOLE 500 MG: 500 INJECTION, SOLUTION INTRAVENOUS at 18:30

## 2025-07-21 RX ADMIN — HYDROMORPHONE HYDROCHLORIDE 0.5 MG: 1 INJECTION, SOLUTION INTRAMUSCULAR; INTRAVENOUS; SUBCUTANEOUS at 16:10

## 2025-07-21 RX ADMIN — HEPARIN SODIUM 5000 UNITS: 5000 INJECTION INTRAVENOUS; SUBCUTANEOUS at 20:21

## 2025-07-21 RX ADMIN — FLUTICASONE PROPIONATE 1 SPRAY: 50 SPRAY, METERED NASAL at 12:44

## 2025-07-21 RX ADMIN — LIDOCAINE HYDROCHLORIDE 50 MG: 20 INJECTION, SOLUTION EPIDURAL; INFILTRATION; INTRACAUDAL; PERINEURAL at 15:06

## 2025-07-21 RX ADMIN — CEFTRIAXONE 1000 MG: 1 INJECTION, POWDER, FOR SOLUTION INTRAMUSCULAR; INTRAVENOUS at 20:21

## 2025-07-21 RX ADMIN — PROPOFOL 50 MG: 10 INJECTION, EMULSION INTRAVENOUS at 15:08

## 2025-07-21 RX ADMIN — SODIUM CHLORIDE, PRESERVATIVE FREE 40 MG: 5 INJECTION INTRAVENOUS at 12:38

## 2025-07-21 RX ADMIN — DEXTROSE MONOHYDRATE, SODIUM CHLORIDE, AND POTASSIUM CHLORIDE: 50; 2.98; 4.5 INJECTION, SOLUTION INTRAVENOUS at 10:58

## 2025-07-21 RX ADMIN — ARFORMOTEROL TARTRATE: 15 SOLUTION RESPIRATORY (INHALATION) at 09:19

## 2025-07-21 RX ADMIN — HYDROMORPHONE HYDROCHLORIDE 0.5 MG: 1 INJECTION, SOLUTION INTRAMUSCULAR; INTRAVENOUS; SUBCUTANEOUS at 09:02

## 2025-07-21 RX ADMIN — ALBUTEROL SULFATE 2.5 MG: 2.5 SOLUTION RESPIRATORY (INHALATION) at 07:46

## 2025-07-21 RX ADMIN — PROPOFOL 100 MG: 10 INJECTION, EMULSION INTRAVENOUS at 15:05

## 2025-07-21 RX ADMIN — HEPARIN SODIUM 5000 UNITS: 5000 INJECTION INTRAVENOUS; SUBCUTANEOUS at 05:43

## 2025-07-21 RX ADMIN — FLUTICASONE PROPIONATE 1 SPRAY: 50 SPRAY, METERED NASAL at 20:21

## 2025-07-21 RX ADMIN — METRONIDAZOLE 500 MG: 500 INJECTION, SOLUTION INTRAVENOUS at 10:51

## 2025-07-21 RX ADMIN — HYDROMORPHONE HYDROCHLORIDE 0.5 MG: 1 INJECTION, SOLUTION INTRAMUSCULAR; INTRAVENOUS; SUBCUTANEOUS at 20:19

## 2025-07-21 ASSESSMENT — PAIN DESCRIPTION - DESCRIPTORS
DESCRIPTORS: ACHING

## 2025-07-21 ASSESSMENT — PAIN DESCRIPTION - ORIENTATION
ORIENTATION: RIGHT;UPPER
ORIENTATION: RIGHT

## 2025-07-21 ASSESSMENT — PAIN - FUNCTIONAL ASSESSMENT
PAIN_FUNCTIONAL_ASSESSMENT: ACTIVITIES ARE NOT PREVENTED
PAIN_FUNCTIONAL_ASSESSMENT: 0-10
PAIN_FUNCTIONAL_ASSESSMENT: ACTIVITIES ARE NOT PREVENTED
PAIN_FUNCTIONAL_ASSESSMENT: 0-10

## 2025-07-21 ASSESSMENT — PAIN DESCRIPTION - ONSET
ONSET: GRADUAL
ONSET: GRADUAL

## 2025-07-21 ASSESSMENT — PAIN SCALES - GENERAL
PAINLEVEL_OUTOF10: 7
PAINLEVEL_OUTOF10: 0
PAINLEVEL_OUTOF10: 10
PAINLEVEL_OUTOF10: 0
PAINLEVEL_OUTOF10: 0
PAINLEVEL_OUTOF10: 10
PAINLEVEL_OUTOF10: 10
PAINLEVEL_OUTOF10: 0

## 2025-07-21 ASSESSMENT — PAIN DESCRIPTION - LOCATION
LOCATION: ABDOMEN

## 2025-07-21 ASSESSMENT — PAIN DESCRIPTION - FREQUENCY
FREQUENCY: INTERMITTENT
FREQUENCY: INTERMITTENT
FREQUENCY: CONTINUOUS

## 2025-07-21 ASSESSMENT — PAIN DESCRIPTION - PAIN TYPE
TYPE: ACUTE PAIN

## 2025-07-21 ASSESSMENT — ENCOUNTER SYMPTOMS: SHORTNESS OF BREATH: 1

## 2025-07-21 NOTE — NURSE NAVIGATOR
Met with patient provided education on Living with Heart Failure, reviewed heart failure zones, reinforced low sodium diet and fluid restrictions. Provided time for questions. Scales provided. Will continue to follow

## 2025-07-21 NOTE — CONSULTS
Interventional Radiology     Consult received from Dr. Russell for evaluation of RUQ fluid collection.     Case and images reviewed by Dr. Poe.     64 year old female with history of dementia, CHF, COPD, and seizure disorder who presented to [facility] ED 7/18/25 for low blood pressure and AMS. Patient found to have UTI and was admitted for further work up and treatment. She had CT of abdomen and pelvis 7/19/25 which showed large chronic complex fluid collection closely confined to superficial and deep borders of right lower chest wall repair mesh. 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 Review of imaging there has been  significant growth of fluid collection since 2016. Patient reported pain along fluid collection today and IR consulted for aspiration. No fever or reports of concern for infection within fluid collection.     Given mesh within collection it would be high risk for developing infection if fluid aspirated. Discussed with Dr Russell and pain has resolved therefore would recommend observation and no intervention at this time      Thank you,  Ly Chaudhary, PA  [phone]  
  [email or web address]  [phone]    GASTROENTEROLOGY CONSULT      Impression:   1. Globus sensation  2. GERD  3. Nausea and vomiting  4. Constipation  5. BRBPR  6. Large intra-abdominal (7 cm) fluid collection RUQ along surgical mesh with mass effect to RUQ and displacement of liver  7. Sepsis      Plan:     1. Increase Linzess to 290 mcg daily  2. Increase PPI to BID dosing  3. Trial of topical hydrocortisone/pramoxine for recurrent bleeding.  4. Address large fluid collection, likely contributing to UGI symptoms in light of mass effect  5. Treat sepsis, likely contributing to nausea/vomiting  6. Will consider EGD/colonoscopy once acute issues addressed, historically inpatient bowel preps are inadequate, will plan to see her as outpatient to schedule these barring acute change as inpatient or failure to improve    Discussed at length with patient and daughter at bedside, who agree with plan of care.      Chief Complaint: nausea, vomiting, rectal bleeding      HPI:  Jacqueline Barnes is a 64 y.o. female who I am being asked to see in consultation for an opinion regarding multiple complaints. Patient admitted with sepsis, likely urinary in etiology, with additional complaints of globus sensation, indigestion, nausea and vomiting. She is on daily PPI as outpatient, denies tato dysphagia. EGD in 2012 was normal. Symptoms worsening over the past several days to weeks.     This morning noted red blood on toilet tissue and in toilet water after straining to pass stool. She has a long history of constipation for which she takes Linzess 145 mcg daily, but this has not helped her symptoms. She does not recall being on the 290 mcg dose. Denies prior rectal bleeding or melena. Last colonoscopy was in 2012, normal.     She has a history of anemia, but h/h normal and stable on admission.     PMH:   Past Medical History:   Diagnosis Date    Allergic rhinitis     Anemia 2/4/2010    Asthma 2/4/2010    CHF (congestive heart failure) 
    Hypertension Sister     Asthma Daughter       Current Facility-Administered Medications   Medication Dose Route Frequency    linaclotide (LINZESS) capsule 290 mcg  290 mcg Oral QAM AC    HYDROmorphone (DILAUDID) injection 0.5 mg  0.5 mg IntraVENous Q4H PRN    oxyCODONE (ROXICODONE) immediate release tablet 2.5 mg  2.5 mg Oral Q4H PRN    pantoprazole (PROTONIX) 40 mg in sodium chloride (PF) 0.9 % 10 mL injection  40 mg IntraVENous Daily    polyethylene glycol (GLYCOLAX) packet 17 g  17 g Oral Daily    sulfur hexafluoride microspheres (LUMASON) 60.7-25 MG injection 2 mL  2 mL IntraVENous ONCE PRN    [Held by provider] 0.9% NaCl with KCl 40 mEq infusion   IntraVENous Continuous    potassium bicarbonate (EFFER-K/K-LYTE) disintegrating tablet 50 mEq  50 mEq Oral Once    bisacodyl (DULCOLAX) suppository 10 mg  10 mg Rectal Daily PRN    metroNIDAZOLE (FLAGYL) 500 mg in 0.9% NaCl 100 mL IVPB premix  500 mg IntraVENous Q8H    ferrous sulfate (IRON 325) tablet 325 mg  325 mg Oral Daily    fluticasone (FLONASE) 50 MCG/ACT nasal spray 1 spray  1 spray Nasal BID    arformoterol 15 mcg-budesonide 0.25 mg neb solution   Nebulization BID RT    montelukast (SINGULAIR) tablet 10 mg  10 mg Oral Nightly    sodium chloride flush 0.9 % injection 5-40 mL  5-40 mL IntraVENous 2 times per day    sodium chloride flush 0.9 % injection 5-40 mL  5-40 mL IntraVENous PRN    0.9 % sodium chloride infusion   IntraVENous PRN    potassium chloride (KLOR-CON M) extended release tablet 40 mEq  40 mEq Oral PRN    Or    potassium bicarb-citric acid (EFFER-K) effervescent tablet 40 mEq  40 mEq Oral PRN    Or    potassium chloride 10 mEq/100 mL IVPB (Peripheral Line)  10 mEq IntraVENous PRN    magnesium sulfate 2000 mg in 50 mL IVPB premix  2,000 mg IntraVENous PRN    ondansetron (ZOFRAN-ODT) disintegrating tablet 4 mg  4 mg Oral Q8H PRN    Or    ondansetron (ZOFRAN) injection 4 mg  4 mg IntraVENous Q6H PRN    polyethylene glycol (GLYCOLAX) packet 17 g

## 2025-07-21 NOTE — PERIOP NOTE
TRANSFER - OUT REPORT:    Verbal report given to JAIME Bernabe on Jacqueline Barnes  being transferred to [facility] for routine post-op       Report consisted of patient's Situation, Background, Assessment and   Recommendations(SBAR).     Information from the following report(s) Nurse Handoff Report was reviewed with the receiving nurse.           Lines:   Peripheral IV 07/19/25 Right;Anterior Forearm (Active)   Site Assessment Clean, dry & intact 07/21/25 1516   Line Status Infusing 07/21/25 1516   Line Care Connections checked and tightened 07/21/25 0341   Phlebitis Assessment No symptoms 07/21/25 1516   Infiltration Assessment 0 07/21/25 1516   Alcohol Cap Used Yes 07/21/25 0341   Dressing Status Clean, dry & intact 07/21/25 1516   Dressing Type Transparent 07/21/25 0341        Opportunity for questions and clarification was provided.      Patient transported with:  Monitor, IV

## 2025-07-21 NOTE — H&P
GASTROENTEROLOGY Pre-Procedure H and P      Impression/Plan:   1. This patient is consented for an EGD for Dysphagia    Addendum: All lab tests orders pertaining to the procedure have been ordered by the anesthesia personnel and results will be addressed by the anesthesia team    Chief Complaint: Dysphagia    HPI:  Jacqueline Barnes is a 64 y.o. female who is having an EGD for Dysphagia    PMH:   Past Medical History:   Diagnosis Date    Allergic rhinitis     Anemia 2/4/2010    Asthma 2/4/2010    CHF (congestive heart failure) ([facility]) 06/17/2022    COPD (chronic obstructive pulmonary disease) ([facility]) 06/22/2022    Essential hypertension, benign 2/4/2010    Hereditary lymphedema     High cholesterol     Hypertension     Lung nodules     Nonspecific elevation of levels of transaminase or lactic acid dehydrogenase (LDH) 2/4/2010    Obstructive sleep apnea     MADHAV (obstructive sleep apnea) 2/4/2010    Polyp of intestine 10/3/08    gastric hyperplastic polyp/Dr. Redd    Seizure disorder ([facility])     daugther reported    Seizure disorder ([facility]) 2/4/2010    SOB (shortness of breath)        PSH:   Past Surgical History:   Procedure Laterality Date    CATARACT REMOVAL  3/27/09, 4/16/09    Dr. Remy    CHEST SURGERY  1/2010    Desmoid Tumor EVMS Dr. Roa    FREEING BOWEL ADHESION,ENTEROLYSIS  2-12-16    Dr. De Leon    HYSTERECTOMY (CERVIX STATUS UNKNOWN)      HYSTERECTOMY, VAGINAL      DC UNLISTED PROCEDURE ABDOMEN PERITONEUM & OMENTUM         Social HX:   Social History     Socioeconomic History    Marital status: Single     Spouse name: Not on file    Number of children: Not on file    Years of education: Not on file    Highest education level: Not on file   Occupational History    Not on file   Tobacco Use    Smoking status: Never    Smokeless tobacco: Never   Vaping Use    Vaping status: Never Used   Substance and Sexual Activity    Alcohol use: No    Drug use: No    Sexual activity: Not on file   Other Topics Concern

## 2025-07-21 NOTE — ANESTHESIA PRE PROCEDURE
Department of Anesthesiology  Preprocedure Note       Name:  Jacqueline Barnes   Age:  64 y.o.  :  1960                                          MRN:  [id number]         Date:  2025      Surgeon: Surgeon(s):  Devin Anne MD    Procedure: Procedure(s):  ESOPHAGOGASTRODUODENOSCOPY    Medications prior to admission:   Prior to Admission medications    Medication Sig Start Date End Date Taking? Authorizing Provider   simvastatin (ZOCOR) 10 MG tablet TAKE 1 TABLET BY MOUTH NIGHTLY 1/3/24   Reema Lainez APRN - NP   ferrous sulfate (FEROSUL) 325 (65 Fe) MG tablet TAKE 1 TABLET BY MOUTH DAILY WITH BREAKFAST 23   Reema Lainez APRN - NP   montelukast (SINGULAIR) 10 MG tablet TAKE 1 TABLET BY MOUTH EVERY NIGHT AT BEDTIME 23   Reema Lainez APRN - NP   albuterol sulfate HFA (PROVENTIL;VENTOLIN;PROAIR) 108 (90 Base) MCG/ACT inhaler Inhale 1-2 puffs into the lungs every 4 hours as needed 11   Automatic Reconciliation, Ar   albuterol (PROVENTIL) (2.5 MG/3ML) 0.083% nebulizer solution Inhale 3 mLs into the lungs every 4 hours as needed 11   Automatic Reconciliation, Ar   fluticasone (FLONASE) 50 MCG/ACT nasal spray 1 spray by Nasal route 2 times daily    Automatic Reconciliation, Ar   fluticasone-salmeterol (ADVAIR) 250-50 MCG/ACT AEPB diskus inhaler Inhale 1 puff into the lungs in the morning and 1 puff in the evening. 11   Automatic Reconciliation, Ar   furosemide (LASIX) 40 MG tablet Take 1 tablet by mouth 2 times daily 19   Automatic Reconciliation, Ar   gabapentin (NEURONTIN) 300 MG capsule Take 1 capsule by mouth 3 times daily. 22   Automatic Reconciliation, Ar   linaclotide (LINZESS) 145 MCG capsule Take 1 capsule by mouth daily    Automatic Reconciliation, Ar   metoprolol tartrate (LOPRESSOR) 50 MG tablet Take 1 tablet by mouth 2 times daily    Automatic Reconciliation, Ar   omeprazole (PRILOSEC) 40 MG delayed release capsule Take 1 capsule by mouth

## 2025-07-21 NOTE — CARE COORDINATION
Demetrice Care  Shelley came to [facility] to follow up on dispo. Case manger available to assist. [phone].      ROBINSON Bustillo   Inpatient Case Management (ICM)   [facility]

## 2025-07-21 NOTE — ANESTHESIA POSTPROCEDURE EVALUATION
Department of Anesthesiology  Postprocedure Note    Patient: Jacqueline Barnes  MRN: [id number]  YOB: 1960  Date of evaluation: 7/21/2025    Procedure Summary       Date: 07/21/25 Room / Location: [facility] ENDO 02 / [facility] ENDOSCOPY    Anesthesia Start: 1459 Anesthesia Stop: 1517    Procedure: ESOPHAGOGASTRODUODENOSCOPY (Upper GI Region) Diagnosis:       Dysphagia, unspecified type      (Dysphagia, unspecified type [R13.10])    Surgeons: Devin Anne MD Responsible Provider: Mary Lux MD    Anesthesia Type: MAC ASA Status: 3            Anesthesia Type: MAC    Mark Phase I: Mark Score: 10    Mark Phase II:      Anesthesia Post Evaluation    Patient location during evaluation: bedside  Patient participation: complete - patient participated  Airway patency: patent  Cardiovascular status: hemodynamically stable  Respiratory status: acceptable  Hydration status: stable    No notable events documented.

## 2025-07-22 ENCOUNTER — APPOINTMENT (OUTPATIENT)
Facility: HOSPITAL | Age: 65
DRG: 871 | End: 2025-07-22
Payer: MEDICARE

## 2025-07-22 LAB — GLUCOSE BLD STRIP.AUTO-MCNC: 114 MG/DL (ref 70–110)

## 2025-07-22 PROCEDURE — 6360000002 HC RX W HCPCS: Performed by: STUDENT IN AN ORGANIZED HEALTH CARE EDUCATION/TRAINING PROGRAM

## 2025-07-22 PROCEDURE — 2500000003 HC RX 250 WO HCPCS: Performed by: STUDENT IN AN ORGANIZED HEALTH CARE EDUCATION/TRAINING PROGRAM

## 2025-07-22 PROCEDURE — 74174 CTA ABD&PLVS W/CONTRAST: CPT

## 2025-07-22 PROCEDURE — 1100000003 HC PRIVATE W/ TELEMETRY

## 2025-07-22 PROCEDURE — 94640 AIRWAY INHALATION TREATMENT: CPT

## 2025-07-22 PROCEDURE — 99233 SBSQ HOSP IP/OBS HIGH 50: CPT | Performed by: SURGERY

## 2025-07-22 PROCEDURE — 6360000004 HC RX CONTRAST MEDICATION: Performed by: STUDENT IN AN ORGANIZED HEALTH CARE EDUCATION/TRAINING PROGRAM

## 2025-07-22 PROCEDURE — 99233 SBSQ HOSP IP/OBS HIGH 50: CPT | Performed by: STUDENT IN AN ORGANIZED HEALTH CARE EDUCATION/TRAINING PROGRAM

## 2025-07-22 PROCEDURE — 94761 N-INVAS EAR/PLS OXIMETRY MLT: CPT

## 2025-07-22 PROCEDURE — 82962 GLUCOSE BLOOD TEST: CPT

## 2025-07-22 RX ORDER — IOPAMIDOL 755 MG/ML
100 INJECTION, SOLUTION INTRAVASCULAR
Status: COMPLETED | OUTPATIENT
Start: 2025-07-22 | End: 2025-07-22

## 2025-07-22 RX ADMIN — SODIUM CHLORIDE, PRESERVATIVE FREE 10 ML: 5 INJECTION INTRAVENOUS at 07:55

## 2025-07-22 RX ADMIN — SODIUM CHLORIDE, PRESERVATIVE FREE 10 ML: 5 INJECTION INTRAVENOUS at 04:38

## 2025-07-22 RX ADMIN — IOPAMIDOL 100 ML: 755 INJECTION, SOLUTION INTRAVENOUS at 13:29

## 2025-07-22 RX ADMIN — HEPARIN SODIUM 5000 UNITS: 5000 INJECTION INTRAVENOUS; SUBCUTANEOUS at 05:56

## 2025-07-22 RX ADMIN — ARFORMOTEROL TARTRATE: 15 SOLUTION RESPIRATORY (INHALATION) at 20:04

## 2025-07-22 RX ADMIN — DEXTROSE MONOHYDRATE, SODIUM CHLORIDE, AND POTASSIUM CHLORIDE: 50; 2.98; 4.5 INJECTION, SOLUTION INTRAVENOUS at 00:28

## 2025-07-22 RX ADMIN — HEPARIN SODIUM 5000 UNITS: 5000 INJECTION INTRAVENOUS; SUBCUTANEOUS at 21:09

## 2025-07-22 RX ADMIN — FLUTICASONE PROPIONATE 1 SPRAY: 50 SPRAY, METERED NASAL at 20:52

## 2025-07-22 RX ADMIN — SODIUM CHLORIDE, PRESERVATIVE FREE 40 MG: 5 INJECTION INTRAVENOUS at 21:09

## 2025-07-22 RX ADMIN — CEFTRIAXONE 1000 MG: 1 INJECTION, POWDER, FOR SOLUTION INTRAMUSCULAR; INTRAVENOUS at 20:55

## 2025-07-22 RX ADMIN — METRONIDAZOLE 500 MG: 500 INJECTION, SOLUTION INTRAVENOUS at 10:03

## 2025-07-22 RX ADMIN — METRONIDAZOLE 500 MG: 500 INJECTION, SOLUTION INTRAVENOUS at 01:29

## 2025-07-22 RX ADMIN — FLUTICASONE PROPIONATE 1 SPRAY: 50 SPRAY, METERED NASAL at 08:58

## 2025-07-22 RX ADMIN — HEPARIN SODIUM 5000 UNITS: 5000 INJECTION INTRAVENOUS; SUBCUTANEOUS at 17:00

## 2025-07-22 RX ADMIN — HYDROMORPHONE HYDROCHLORIDE 0.5 MG: 1 INJECTION, SOLUTION INTRAMUSCULAR; INTRAVENOUS; SUBCUTANEOUS at 00:17

## 2025-07-22 RX ADMIN — HYDROMORPHONE HYDROCHLORIDE 0.5 MG: 1 INJECTION, SOLUTION INTRAMUSCULAR; INTRAVENOUS; SUBCUTANEOUS at 09:56

## 2025-07-22 RX ADMIN — SODIUM CHLORIDE, PRESERVATIVE FREE 40 MG: 5 INJECTION INTRAVENOUS at 00:18

## 2025-07-22 RX ADMIN — SODIUM CHLORIDE, PRESERVATIVE FREE 40 MG: 5 INJECTION INTRAVENOUS at 08:58

## 2025-07-22 RX ADMIN — HYDROMORPHONE HYDROCHLORIDE 0.5 MG: 1 INJECTION, SOLUTION INTRAMUSCULAR; INTRAVENOUS; SUBCUTANEOUS at 12:49

## 2025-07-22 RX ADMIN — METRONIDAZOLE 500 MG: 500 INJECTION, SOLUTION INTRAVENOUS at 18:08

## 2025-07-22 RX ADMIN — HYDROMORPHONE HYDROCHLORIDE 0.5 MG: 1 INJECTION, SOLUTION INTRAMUSCULAR; INTRAVENOUS; SUBCUTANEOUS at 16:58

## 2025-07-22 RX ADMIN — ONDANSETRON 4 MG: 2 INJECTION, SOLUTION INTRAMUSCULAR; INTRAVENOUS at 07:51

## 2025-07-22 RX ADMIN — ONDANSETRON 4 MG: 2 INJECTION, SOLUTION INTRAMUSCULAR; INTRAVENOUS at 19:02

## 2025-07-22 RX ADMIN — HYDROMORPHONE HYDROCHLORIDE 0.5 MG: 1 INJECTION, SOLUTION INTRAMUSCULAR; INTRAVENOUS; SUBCUTANEOUS at 06:20

## 2025-07-22 ASSESSMENT — PAIN DESCRIPTION - LOCATION
LOCATION: ABDOMEN

## 2025-07-22 ASSESSMENT — PAIN SCALES - GENERAL
PAINLEVEL_OUTOF10: 2
PAINLEVEL_OUTOF10: 0
PAINLEVEL_OUTOF10: 10
PAINLEVEL_OUTOF10: 8
PAINLEVEL_OUTOF10: 10
PAINLEVEL_OUTOF10: 7
PAINLEVEL_OUTOF10: 0
PAINLEVEL_OUTOF10: 0
PAINLEVEL_OUTOF10: 2
PAINLEVEL_OUTOF10: 8

## 2025-07-22 ASSESSMENT — PAIN DESCRIPTION - PAIN TYPE
TYPE: ACUTE PAIN

## 2025-07-22 ASSESSMENT — PAIN DESCRIPTION - DESCRIPTORS
DESCRIPTORS: SHARP;ACHING;STABBING
DESCRIPTORS: ACHING;SHARP
DESCRIPTORS: SHARP;ACHING;STABBING
DESCRIPTORS: SHARP;ACHING;STABBING
DESCRIPTORS: SORE;STABBING

## 2025-07-22 ASSESSMENT — PAIN DESCRIPTION - ORIENTATION
ORIENTATION: RIGHT

## 2025-07-22 ASSESSMENT — PAIN DESCRIPTION - FREQUENCY
FREQUENCY: INTERMITTENT

## 2025-07-22 ASSESSMENT — PAIN - FUNCTIONAL ASSESSMENT
PAIN_FUNCTIONAL_ASSESSMENT: ACTIVITIES ARE NOT PREVENTED

## 2025-07-22 ASSESSMENT — PAIN DESCRIPTION - DIRECTION: RADIATING_TOWARDS: N/

## 2025-07-22 ASSESSMENT — PAIN DESCRIPTION - ONSET
ONSET: GRADUAL

## 2025-07-22 ASSESSMENT — PAIN SCALES - WONG BAKER
WONGBAKER_NUMERICALRESPONSE: NO HURT

## 2025-07-22 NOTE — CARE COORDINATION
Case Management Assessment  Initial Evaluation    Date/Time of Evaluation: 7/22/2025 11:25 AM  Assessment Completed by: RD LOU RN      Patient Name: Jacqueline Barnes                   YOB: 1960  Diagnosis: Essential hypertension [I10]  Complicated UTI (urinary tract infection) [N39.0]  Sepsis (HCC) [A41.9]  Congestive heart failure, unspecified HF chronicity, unspecified heart failure type (HCC) [I50.9]  Sepsis with acute renal failure, due to unspecified organism, unspecified acute renal failure type, unspecified whether septic shock present (HCC) [A41.9, R65.20, N17.9]                   Date / Time: 7/18/2025  4:34 PM      Met with patient in room. HIPAA verified. Initial case management admission assessment completed with patient's permission. Patient Demographics verified. Patient's preference for disposition at discharge is home with family support. Case management needs pending. Patient states that her daughter will transport patient home at time of discharge. Will send out Home health referrals proactively.       07/22/25 1108   Service Assessment   Patient Orientation Alert and Oriented   Cognition Alert   History Provided By Patient   Primary Caregiver Self   Accompanied By/Relationship No one at this time.   Support Systems Children;Family Members   Patient's Healthcare Decision Maker is: Legal Next of Kin   PCP Verified by CM Yes  ([facility] patient)   Prior Functional Level Assistance with the following:;Bathing;Cooking;Housework;Shopping   Current Functional Level Assistance with the following:;Bathing;Dressing;Toileting;Cooking;Housework;Shopping;Mobility   Can patient return to prior living arrangement Unknown at present   Ability to make needs known: Good   Family able to assist with home care needs: Yes   Would you like for me to discuss the discharge plan with any other family members/significant others, and if so, who? Yes  (daughter)   Financial Resources Medicaid;Medicare

## 2025-07-23 ENCOUNTER — APPOINTMENT (OUTPATIENT)
Facility: HOSPITAL | Age: 65
DRG: 871 | End: 2025-07-23
Payer: MEDICARE

## 2025-07-23 PROBLEM — D64.9 NORMOCYTIC ANEMIA: Status: ACTIVE | Noted: 2025-07-23

## 2025-07-23 LAB
ALBUMIN SERPL-MCNC: 3 G/DL (ref 3.4–5)
ALBUMIN/GLOB SERPL: 0.9 (ref 0.8–1.7)
ALP SERPL-CCNC: 69 U/L (ref 45–117)
ALT SERPL-CCNC: 17 U/L (ref 10–35)
ANION GAP SERPL CALC-SCNC: 10 MMOL/L (ref 3–18)
AST SERPL-CCNC: 27 U/L (ref 10–38)
BASOPHILS # BLD: 0.02 K/UL (ref 0–0.1)
BASOPHILS NFR BLD: 0.5 % (ref 0–2)
BILIRUB SERPL-MCNC: 0.4 MG/DL (ref 0.2–1)
BUN SERPL-MCNC: 10 MG/DL (ref 6–23)
BUN/CREAT SERPL: 13 (ref 12–20)
CALCIUM SERPL-MCNC: 9.3 MG/DL (ref 8.5–10.1)
CHLORIDE SERPL-SCNC: 108 MMOL/L (ref 98–107)
CO2 SERPL-SCNC: 27 MMOL/L (ref 21–32)
CREAT SERPL-MCNC: 0.77 MG/DL (ref 0.6–1.3)
DIFFERENTIAL METHOD BLD: ABNORMAL
EOSINOPHIL # BLD: 0.08 K/UL (ref 0–0.4)
EOSINOPHIL NFR BLD: 2.2 % (ref 0–5)
ERYTHROCYTE [DISTWIDTH] IN BLOOD BY AUTOMATED COUNT: 13.2 % (ref 11.6–14.5)
GLOBULIN SER CALC-MCNC: 3.2 G/DL (ref 2–4)
GLUCOSE SERPL-MCNC: 104 MG/DL (ref 74–108)
HCT VFR BLD AUTO: 35 % (ref 35–45)
HGB BLD-MCNC: 11.6 G/DL (ref 12–16)
IMM GRANULOCYTES # BLD AUTO: 0.01 K/UL (ref 0–0.04)
IMM GRANULOCYTES NFR BLD AUTO: 0.3 % (ref 0–0.5)
LYMPHOCYTES # BLD: 0.93 K/UL (ref 0.9–3.6)
LYMPHOCYTES NFR BLD: 25.4 % (ref 21–52)
MCH RBC QN AUTO: 29.7 PG (ref 24–34)
MCHC RBC AUTO-ENTMCNC: 33.1 G/DL (ref 31–37)
MCV RBC AUTO: 89.7 FL (ref 78–100)
MONOCYTES # BLD: 0.51 K/UL (ref 0.05–1.2)
MONOCYTES NFR BLD: 13.9 % (ref 3–10)
NEUTS SEG # BLD: 2.11 K/UL (ref 1.8–8)
NEUTS SEG NFR BLD: 57.7 % (ref 40–73)
NRBC # BLD: 0 K/UL (ref 0–0.01)
NRBC BLD-RTO: 0 PER 100 WBC
PLATELET # BLD AUTO: 206 K/UL (ref 135–420)
PMV BLD AUTO: 9.2 FL (ref 9.2–11.8)
POTASSIUM SERPL-SCNC: 3.6 MMOL/L (ref 3.5–5.5)
PROT SERPL-MCNC: 6.1 G/DL (ref 6.4–8.2)
RBC # BLD AUTO: 3.9 M/UL (ref 4.2–5.3)
SODIUM SERPL-SCNC: 145 MMOL/L (ref 136–145)
WBC # BLD AUTO: 3.7 K/UL (ref 4.6–13.2)

## 2025-07-23 PROCEDURE — 80053 COMPREHEN METABOLIC PANEL: CPT

## 2025-07-23 PROCEDURE — 1100000003 HC PRIVATE W/ TELEMETRY

## 2025-07-23 PROCEDURE — 2500000003 HC RX 250 WO HCPCS: Performed by: STUDENT IN AN ORGANIZED HEALTH CARE EDUCATION/TRAINING PROGRAM

## 2025-07-23 PROCEDURE — 6360000002 HC RX W HCPCS: Performed by: STUDENT IN AN ORGANIZED HEALTH CARE EDUCATION/TRAINING PROGRAM

## 2025-07-23 PROCEDURE — 94640 AIRWAY INHALATION TREATMENT: CPT

## 2025-07-23 PROCEDURE — 74230 X-RAY XM SWLNG FUNCJ C+: CPT

## 2025-07-23 PROCEDURE — 99232 SBSQ HOSP IP/OBS MODERATE 35: CPT | Performed by: STUDENT IN AN ORGANIZED HEALTH CARE EDUCATION/TRAINING PROGRAM

## 2025-07-23 PROCEDURE — 36415 COLL VENOUS BLD VENIPUNCTURE: CPT

## 2025-07-23 PROCEDURE — 97166 OT EVAL MOD COMPLEX 45 MIN: CPT

## 2025-07-23 PROCEDURE — 97162 PT EVAL MOD COMPLEX 30 MIN: CPT

## 2025-07-23 PROCEDURE — 97535 SELF CARE MNGMENT TRAINING: CPT

## 2025-07-23 PROCEDURE — 92526 ORAL FUNCTION THERAPY: CPT

## 2025-07-23 PROCEDURE — 92610 EVALUATE SWALLOWING FUNCTION: CPT

## 2025-07-23 PROCEDURE — 97116 GAIT TRAINING THERAPY: CPT

## 2025-07-23 PROCEDURE — 85025 COMPLETE CBC W/AUTO DIFF WBC: CPT

## 2025-07-23 PROCEDURE — 92611 MOTION FLUOROSCOPY/SWALLOW: CPT

## 2025-07-23 PROCEDURE — 94761 N-INVAS EAR/PLS OXIMETRY MLT: CPT

## 2025-07-23 RX ORDER — METOCLOPRAMIDE HYDROCHLORIDE 5 MG/ML
10 INJECTION INTRAMUSCULAR; INTRAVENOUS EVERY 6 HOURS
Status: DISCONTINUED | OUTPATIENT
Start: 2025-07-23 | End: 2025-07-24 | Stop reason: HOSPADM

## 2025-07-23 RX ADMIN — METRONIDAZOLE 500 MG: 500 INJECTION, SOLUTION INTRAVENOUS at 17:37

## 2025-07-23 RX ADMIN — HEPARIN SODIUM 5000 UNITS: 5000 INJECTION INTRAVENOUS; SUBCUTANEOUS at 20:10

## 2025-07-23 RX ADMIN — BARIUM SULFATE 30 ML: 960 POWDER, FOR SUSPENSION ORAL at 11:15

## 2025-07-23 RX ADMIN — ARFORMOTEROL TARTRATE: 15 SOLUTION RESPIRATORY (INHALATION) at 07:44

## 2025-07-23 RX ADMIN — FLUTICASONE PROPIONATE 1 SPRAY: 50 SPRAY, METERED NASAL at 09:49

## 2025-07-23 RX ADMIN — HYDROMORPHONE HYDROCHLORIDE 0.5 MG: 1 INJECTION, SOLUTION INTRAMUSCULAR; INTRAVENOUS; SUBCUTANEOUS at 06:43

## 2025-07-23 RX ADMIN — METOCLOPRAMIDE HYDROCHLORIDE 10 MG: 5 INJECTION INTRAMUSCULAR; INTRAVENOUS at 11:42

## 2025-07-23 RX ADMIN — HYDROMORPHONE HYDROCHLORIDE 0.5 MG: 1 INJECTION, SOLUTION INTRAMUSCULAR; INTRAVENOUS; SUBCUTANEOUS at 11:45

## 2025-07-23 RX ADMIN — SODIUM CHLORIDE, PRESERVATIVE FREE 10 ML: 5 INJECTION INTRAVENOUS at 09:39

## 2025-07-23 RX ADMIN — SODIUM CHLORIDE, PRESERVATIVE FREE 10 ML: 5 INJECTION INTRAVENOUS at 20:22

## 2025-07-23 RX ADMIN — METRONIDAZOLE 500 MG: 500 INJECTION, SOLUTION INTRAVENOUS at 09:49

## 2025-07-23 RX ADMIN — SODIUM CHLORIDE, PRESERVATIVE FREE 10 ML: 5 INJECTION INTRAVENOUS at 04:51

## 2025-07-23 RX ADMIN — METRONIDAZOLE 500 MG: 500 INJECTION, SOLUTION INTRAVENOUS at 02:19

## 2025-07-23 RX ADMIN — METOCLOPRAMIDE HYDROCHLORIDE 10 MG: 5 INJECTION INTRAMUSCULAR; INTRAVENOUS at 23:03

## 2025-07-23 RX ADMIN — METOCLOPRAMIDE HYDROCHLORIDE 10 MG: 5 INJECTION INTRAMUSCULAR; INTRAVENOUS at 17:03

## 2025-07-23 RX ADMIN — SODIUM CHLORIDE, PRESERVATIVE FREE 20 MG: 5 INJECTION INTRAVENOUS at 20:10

## 2025-07-23 RX ADMIN — DEXTROSE MONOHYDRATE, SODIUM CHLORIDE, AND POTASSIUM CHLORIDE: 50; 2.98; 4.5 INJECTION, SOLUTION INTRAVENOUS at 15:00

## 2025-07-23 RX ADMIN — FLUTICASONE PROPIONATE 1 SPRAY: 50 SPRAY, METERED NASAL at 20:14

## 2025-07-23 RX ADMIN — SODIUM CHLORIDE, PRESERVATIVE FREE 40 MG: 5 INJECTION INTRAVENOUS at 09:40

## 2025-07-23 RX ADMIN — HEPARIN SODIUM 5000 UNITS: 5000 INJECTION INTRAVENOUS; SUBCUTANEOUS at 06:03

## 2025-07-23 RX ADMIN — HYDROMORPHONE HYDROCHLORIDE 0.5 MG: 1 INJECTION, SOLUTION INTRAMUSCULAR; INTRAVENOUS; SUBCUTANEOUS at 20:05

## 2025-07-23 RX ADMIN — ARFORMOTEROL TARTRATE: 15 SOLUTION RESPIRATORY (INHALATION) at 21:29

## 2025-07-23 RX ADMIN — HEPARIN SODIUM 5000 UNITS: 5000 INJECTION INTRAVENOUS; SUBCUTANEOUS at 15:00

## 2025-07-23 RX ADMIN — SODIUM CHLORIDE, PRESERVATIVE FREE 40 MG: 5 INJECTION INTRAVENOUS at 23:03

## 2025-07-23 ASSESSMENT — PAIN DESCRIPTION - ORIENTATION
ORIENTATION: RIGHT

## 2025-07-23 ASSESSMENT — PAIN SCALES - GENERAL
PAINLEVEL_OUTOF10: 8
PAINLEVEL_OUTOF10: 0
PAINLEVEL_OUTOF10: 2
PAINLEVEL_OUTOF10: 2
PAINLEVEL_OUTOF10: 10
PAINLEVEL_OUTOF10: 0
PAINLEVEL_OUTOF10: 10
PAINLEVEL_OUTOF10: 0

## 2025-07-23 ASSESSMENT — PAIN DESCRIPTION - ONSET
ONSET: SUDDEN
ONSET: SUDDEN
ONSET: ON-GOING
ONSET: SUDDEN

## 2025-07-23 ASSESSMENT — PAIN DESCRIPTION - PAIN TYPE
TYPE: ACUTE PAIN
TYPE: ACUTE PAIN
TYPE: ACUTE PAIN;CHRONIC PAIN

## 2025-07-23 ASSESSMENT — PAIN DESCRIPTION - DESCRIPTORS
DESCRIPTORS: POUNDING
DESCRIPTORS: POUNDING
DESCRIPTORS: SHARP
DESCRIPTORS: ACHING;SHARP

## 2025-07-23 ASSESSMENT — PAIN DESCRIPTION - FREQUENCY
FREQUENCY: INTERMITTENT

## 2025-07-23 ASSESSMENT — PAIN - FUNCTIONAL ASSESSMENT
PAIN_FUNCTIONAL_ASSESSMENT: ACTIVITIES ARE NOT PREVENTED
PAIN_FUNCTIONAL_ASSESSMENT: ACTIVITIES ARE NOT PREVENTED

## 2025-07-23 ASSESSMENT — PAIN SCALES - WONG BAKER: WONGBAKER_NUMERICALRESPONSE: NO HURT

## 2025-07-23 ASSESSMENT — PAIN DESCRIPTION - LOCATION
LOCATION: ABDOMEN

## 2025-07-23 NOTE — CARE COORDINATION
Met with patient and family at bedside and discussed therapy recommendation of SNF. Patient not agreeable. Family aware and supports patients decision.     Patient and family declined choice list.     MSW to follow up with HH 7/24 as patient requests to DC ome with HH and family support.     ROBINSON Bustillo   Inpatient Case Management (ICM)   [facility]

## 2025-07-23 NOTE — CARE COORDINATION
MSW proactively sent SNF referrals. ICM  to provide patient with choice list.     ROBINSON Bustillo   Inpatient Case Management (ICM)   [facility]     stated

## 2025-07-24 VITALS
BODY MASS INDEX: 39.37 KG/M2 | DIASTOLIC BLOOD PRESSURE: 85 MMHG | HEART RATE: 80 BPM | OXYGEN SATURATION: 97 % | RESPIRATION RATE: 17 BRPM | WEIGHT: 245 LBS | HEIGHT: 66 IN | TEMPERATURE: 98.1 F | SYSTOLIC BLOOD PRESSURE: 136 MMHG

## 2025-07-24 LAB
ALBUMIN SERPL-MCNC: 2.9 G/DL (ref 3.4–5)
ALBUMIN/GLOB SERPL: 1 (ref 0.8–1.7)
ALP SERPL-CCNC: 66 U/L (ref 45–117)
ALT SERPL-CCNC: 18 U/L (ref 10–35)
ANION GAP SERPL CALC-SCNC: 9 MMOL/L (ref 3–18)
AST SERPL-CCNC: 28 U/L (ref 10–38)
BASOPHILS # BLD: 0.02 K/UL (ref 0–0.1)
BASOPHILS NFR BLD: 0.5 % (ref 0–2)
BILIRUB SERPL-MCNC: 0.4 MG/DL (ref 0.2–1)
BUN SERPL-MCNC: 9 MG/DL (ref 6–23)
BUN/CREAT SERPL: 12 (ref 12–20)
CALCIUM SERPL-MCNC: 9 MG/DL (ref 8.5–10.1)
CHLORIDE SERPL-SCNC: 108 MMOL/L (ref 98–107)
CO2 SERPL-SCNC: 25 MMOL/L (ref 21–32)
CREAT SERPL-MCNC: 0.69 MG/DL (ref 0.6–1.3)
DIFFERENTIAL METHOD BLD: ABNORMAL
EOSINOPHIL # BLD: 0.12 K/UL (ref 0–0.4)
EOSINOPHIL NFR BLD: 3.1 % (ref 0–5)
ERYTHROCYTE [DISTWIDTH] IN BLOOD BY AUTOMATED COUNT: 13.2 % (ref 11.6–14.5)
GLOBULIN SER CALC-MCNC: 3 G/DL (ref 2–4)
GLUCOSE SERPL-MCNC: 100 MG/DL (ref 74–108)
HCT VFR BLD AUTO: 35.7 % (ref 35–45)
HGB BLD-MCNC: 11.6 G/DL (ref 12–16)
IMM GRANULOCYTES # BLD AUTO: 0.01 K/UL (ref 0–0.04)
IMM GRANULOCYTES NFR BLD AUTO: 0.3 % (ref 0–0.5)
LYMPHOCYTES # BLD: 1.13 K/UL (ref 0.9–3.6)
LYMPHOCYTES NFR BLD: 29 % (ref 21–52)
MAGNESIUM SERPL-MCNC: 2 MG/DL (ref 1.6–2.6)
MCH RBC QN AUTO: 29.5 PG (ref 24–34)
MCHC RBC AUTO-ENTMCNC: 32.5 G/DL (ref 31–37)
MCV RBC AUTO: 90.8 FL (ref 78–100)
MONOCYTES # BLD: 0.55 K/UL (ref 0.05–1.2)
MONOCYTES NFR BLD: 14.1 % (ref 3–10)
NEUTS SEG # BLD: 2.06 K/UL (ref 1.8–8)
NEUTS SEG NFR BLD: 53 % (ref 40–73)
NRBC # BLD: 0 K/UL (ref 0–0.01)
NRBC BLD-RTO: 0 PER 100 WBC
PLATELET # BLD AUTO: 210 K/UL (ref 135–420)
PMV BLD AUTO: 9.2 FL (ref 9.2–11.8)
POTASSIUM SERPL-SCNC: 3.6 MMOL/L (ref 3.5–5.5)
PROT SERPL-MCNC: 5.9 G/DL (ref 6.4–8.2)
RBC # BLD AUTO: 3.93 M/UL (ref 4.2–5.3)
SODIUM SERPL-SCNC: 142 MMOL/L (ref 136–145)
WBC # BLD AUTO: 3.9 K/UL (ref 4.6–13.2)

## 2025-07-24 PROCEDURE — 94761 N-INVAS EAR/PLS OXIMETRY MLT: CPT

## 2025-07-24 PROCEDURE — 97530 THERAPEUTIC ACTIVITIES: CPT

## 2025-07-24 PROCEDURE — 97535 SELF CARE MNGMENT TRAINING: CPT

## 2025-07-24 PROCEDURE — 94640 AIRWAY INHALATION TREATMENT: CPT

## 2025-07-24 PROCEDURE — 6370000000 HC RX 637 (ALT 250 FOR IP): Performed by: STUDENT IN AN ORGANIZED HEALTH CARE EDUCATION/TRAINING PROGRAM

## 2025-07-24 PROCEDURE — 6370000000 HC RX 637 (ALT 250 FOR IP): Performed by: INTERNAL MEDICINE

## 2025-07-24 PROCEDURE — 6360000002 HC RX W HCPCS: Performed by: STUDENT IN AN ORGANIZED HEALTH CARE EDUCATION/TRAINING PROGRAM

## 2025-07-24 PROCEDURE — 2500000003 HC RX 250 WO HCPCS: Performed by: STUDENT IN AN ORGANIZED HEALTH CARE EDUCATION/TRAINING PROGRAM

## 2025-07-24 PROCEDURE — 99239 HOSP IP/OBS DSCHRG MGMT >30: CPT | Performed by: STUDENT IN AN ORGANIZED HEALTH CARE EDUCATION/TRAINING PROGRAM

## 2025-07-24 PROCEDURE — 85025 COMPLETE CBC W/AUTO DIFF WBC: CPT

## 2025-07-24 PROCEDURE — 80053 COMPREHEN METABOLIC PANEL: CPT

## 2025-07-24 PROCEDURE — 94664 DEMO&/EVAL PT USE INHALER: CPT

## 2025-07-24 PROCEDURE — 36415 COLL VENOUS BLD VENIPUNCTURE: CPT

## 2025-07-24 PROCEDURE — 97116 GAIT TRAINING THERAPY: CPT

## 2025-07-24 PROCEDURE — 83735 ASSAY OF MAGNESIUM: CPT

## 2025-07-24 RX ORDER — METOCLOPRAMIDE 10 MG/1
10 TABLET ORAL EVERY 6 HOURS PRN
Qty: 20 TABLET | Refills: 0 | Status: SHIPPED | OUTPATIENT
Start: 2025-07-24 | End: 2025-07-29

## 2025-07-24 RX ADMIN — METOCLOPRAMIDE HYDROCHLORIDE 10 MG: 5 INJECTION INTRAMUSCULAR; INTRAVENOUS at 05:16

## 2025-07-24 RX ADMIN — METOCLOPRAMIDE HYDROCHLORIDE 10 MG: 5 INJECTION INTRAMUSCULAR; INTRAVENOUS at 11:29

## 2025-07-24 RX ADMIN — HEPARIN SODIUM 5000 UNITS: 5000 INJECTION INTRAVENOUS; SUBCUTANEOUS at 05:16

## 2025-07-24 RX ADMIN — FLUTICASONE PROPIONATE 1 SPRAY: 50 SPRAY, METERED NASAL at 11:47

## 2025-07-24 RX ADMIN — DEXTROSE MONOHYDRATE, SODIUM CHLORIDE, AND POTASSIUM CHLORIDE: 50; 2.98; 4.5 INJECTION, SOLUTION INTRAVENOUS at 05:14

## 2025-07-24 RX ADMIN — DOCUSATE SODIUM AND SENNOSIDES 1 TABLET: 8.6; 5 TABLET, FILM COATED ORAL at 11:29

## 2025-07-24 RX ADMIN — METRONIDAZOLE 500 MG: 500 INJECTION, SOLUTION INTRAVENOUS at 11:54

## 2025-07-24 RX ADMIN — SODIUM CHLORIDE, PRESERVATIVE FREE 10 ML: 5 INJECTION INTRAVENOUS at 11:48

## 2025-07-24 RX ADMIN — POLYETHYLENE GLYCOL 3350 17 G: 17 POWDER, FOR SOLUTION ORAL at 11:28

## 2025-07-24 RX ADMIN — SODIUM CHLORIDE, PRESERVATIVE FREE 20 MG: 5 INJECTION INTRAVENOUS at 11:30

## 2025-07-24 RX ADMIN — LINACLOTIDE 290 MCG: 145 CAPSULE, GELATIN COATED ORAL at 06:27

## 2025-07-24 RX ADMIN — ARFORMOTEROL TARTRATE: 15 SOLUTION RESPIRATORY (INHALATION) at 08:15

## 2025-07-24 RX ADMIN — METRONIDAZOLE 500 MG: 500 INJECTION, SOLUTION INTRAVENOUS at 01:35

## 2025-07-24 RX ADMIN — FERROUS SULFATE TAB 325 MG (65 MG ELEMENTAL FE) 325 MG: 325 (65 FE) TAB at 11:31

## 2025-07-24 RX ADMIN — SODIUM CHLORIDE, PRESERVATIVE FREE 40 MG: 5 INJECTION INTRAVENOUS at 11:30

## 2025-07-24 ASSESSMENT — PAIN SCALES - GENERAL: PAINLEVEL_OUTOF10: 0

## 2025-07-24 NOTE — PLAN OF CARE
Problem: Chronic Conditions and Co-morbidities  Goal: Patient's chronic conditions and co-morbidity symptoms are monitored and maintained or improved  Outcome: Progressing  Flowsheets (Taken 7/20/2025 0800)  Care Plan - Patient's Chronic Conditions and Co-Morbidity Symptoms are Monitored and Maintained or Improved: Monitor and assess patient's chronic conditions and comorbid symptoms for stability, deterioration, or improvement     Problem: Discharge Planning  Goal: Discharge to home or other facility with appropriate resources  Outcome: Progressing  Flowsheets (Taken 7/20/2025 0800)  Discharge to home or other facility with appropriate resources: Identify barriers to discharge with patient and caregiver     Problem: Safety - Adult  Goal: Free from fall injury  Outcome: Progressing     
  Problem: Chronic Conditions and Co-morbidities  Goal: Patient's chronic conditions and co-morbidity symptoms are monitored and maintained or improved  Outcome: Progressing  Flowsheets (Taken 7/21/2025 0800 by Florecita Jorge, RN)  Care Plan - Patient's Chronic Conditions and Co-Morbidity Symptoms are Monitored and Maintained or Improved: Monitor and assess patient's chronic conditions and comorbid symptoms for stability, deterioration, or improvement  Note: Medicate patient per MAR. Monitor labs and vitals.     Problem: Discharge Planning  Goal: Discharge to home or other facility with appropriate resources  Outcome: Progressing  Flowsheets (Taken 7/21/2025 0800 by Florecita Jorge, RN)  Discharge to home or other facility with appropriate resources: Identify barriers to discharge with patient and caregiver  Note: Identify barriers prior to d/c.     Problem: Safety - Adult  Goal: Free from fall injury  Outcome: Progressing  Note: Standard fall precautions; Frequent rounding.     Problem: Pain  Goal: Verbalizes/displays adequate comfort level or baseline comfort level  Outcome: Progressing  Note: Patient to verbalize pain via call bell or during rounding.     Problem: Skin/Tissue Integrity  Goal: Skin integrity remains intact  Description: 1.  Monitor for areas of redness and/or skin breakdown  2.  Assess vascular access sites hourly  3.  Every 4-6 hours minimum:  Change oxygen saturation probe site  4.  Every 4-6 hours:  If on nasal continuous positive airway pressure, respiratory therapy assess nares and determine need for appliance change or resting period  Outcome: Progressing  Note: Monitor for s/s of skin breakdown.     
  Problem: Chronic Conditions and Co-morbidities  Goal: Patient's chronic conditions and co-morbidity symptoms are monitored and maintained or improved  Outcome: Progressing  Flowsheets (Taken 7/24/2025 0810)  Care Plan - Patient's Chronic Conditions and Co-Morbidity Symptoms are Monitored and Maintained or Improved:   Monitor and assess patient's chronic conditions and comorbid symptoms for stability, deterioration, or improvement   Update acute care plan with appropriate goals if chronic or comorbid symptoms are exacerbated and prevent overall improvement and discharge  Note: Monitor for new onset of symptoms. Instruct patient to report new onset or ongoing symptoms     Problem: Discharge Planning  Goal: Discharge to home or other facility with appropriate resources  Outcome: Progressing     Problem: Safety - Adult  Goal: Free from fall injury  Outcome: Progressing     Problem: Pain  Goal: Verbalizes/displays adequate comfort level or baseline comfort level  Outcome: Progressing     Problem: Skin/Tissue Integrity  Goal: Skin integrity remains intact  Description: 1.  Monitor for areas of redness and/or skin breakdown  2.  Assess vascular access sites hourly  3.  Every 4-6 hours minimum:  Change oxygen saturation probe site  4.  Every 4-6 hours:  If on nasal continuous positive airway pressure, respiratory therapy assess nares and determine need for appliance change or resting period  Outcome: Progressing     
  Problem: Chronic Conditions and Co-morbidities  Goal: Patient's chronic conditions and co-morbidity symptoms are monitored and maintained or improved  Outcome: Progressing  Note: Medicate patient per MAR; Monitor vitals and labs.     Problem: Discharge Planning  Goal: Discharge to home or other facility with appropriate resources  Outcome: Progressing  Flowsheets (Taken 7/21/2025 0800 by Florecita Jorge RN)  Discharge to home or other facility with appropriate resources: Identify barriers to discharge with patient and caregiver  Note: Barriers to d/c: Not able to keep food/drink down;  RQ pain     Problem: Safety - Adult  Goal: Free from fall injury  Outcome: Progressing  Note: Standard safety precautions.     Problem: Pain  Goal: Verbalizes/displays adequate comfort level or baseline comfort level  Outcome: Progressing  Note: Patient to verbalize pain via call bell or during rounding.     Problem: Skin/Tissue Integrity  Goal: Skin integrity remains intact  Description: 1.  Monitor for areas of redness and/or skin breakdown  2.  Assess vascular access sites hourly  3.  Every 4-6 hours minimum:  Change oxygen saturation probe site  4.  Every 4-6 hours:  If on nasal continuous positive airway pressure, respiratory therapy assess nares and determine need for appliance change or resting period  Outcome: Progressing  Note: Monitor skin for s/s of breakdown.     
  Problem: Occupational Therapy - Adult  Goal: By Discharge: Performs self-care activities at highest level of function for planned discharge setting.  See evaluation for individualized goals.  Description: Occupational Therapy Goals:  Initiated 7/23/2025 to be met within 7-10 days.    1.  Patient will perform grooming while standing at sink with supervision/set-up.   2.  Patient will perform upper body dressing with supervision/set-up.  3.  Patient will perform lower body dressing  with supervision/set-up.  4.  Patient will perform toilet transfers with supervision/set-up.  5.  Patient will perform all aspects of toileting with supervision/set-up.  6.  Patient will participate in upper extremity therapeutic exercise/activities with supervision/set-up for 8-10 minutes to increase strength/endurance for ADLs.    7.  Patient will utilize energy conservation techniques during functional activities with verbal cues.    PLOF: Pt lives w/ dtr in a 1 , has ramped entrance, tub/shower. Dtr assists w/ ADLs as needed. Pt uses rollator as needed for functional mobility.     Outcome: Progressing    OCCUPATIONAL THERAPY EVALUATION    Patient: Jacqueline Barnes (64 y.o. female)  Date: 7/23/2025  Primary Diagnosis: Essential hypertension [I10]  Complicated UTI (urinary tract infection) [N39.0]  Sepsis ([facility]) [A41.9]  Congestive heart failure, unspecified HF chronicity, unspecified heart failure type ([facility]) [I50.9]  Sepsis with acute renal failure, due to unspecified organism, unspecified acute renal failure type, unspecified whether septic shock present ([facility]) [A41.9, R65.20, N17.9]  Procedure(s) (LRB):  ESOPHAGOGASTRODUODENOSCOPY w/ 52 Fr dilation, BXs (N/A) 2 Days Post-Op   Precautions: Fall Risk, General Precautions,       ASSESSMENT :  Pt is semi supine in bed, agreeable to OT session. Pt seen alongside PT to maximize pt's safety and participation. Pt is very impulsive w/ activity throughout session, and presents w/ poor safety 
  Problem: Occupational Therapy - Adult  Goal: By Discharge: Performs self-care activities at highest level of function for planned discharge setting.  See evaluation for individualized goals.  Description: Occupational Therapy Goals:  Initiated 7/23/2025 to be met within 7-10 days.    1.  Patient will perform grooming while standing at sink with supervision/set-up.   2.  Patient will perform upper body dressing with supervision/set-up.  3.  Patient will perform lower body dressing  with supervision/set-up.  4.  Patient will perform toilet transfers with supervision/set-up.  5.  Patient will perform all aspects of toileting with supervision/set-up.  6.  Patient will participate in upper extremity therapeutic exercise/activities with supervision/set-up for 8-10 minutes to increase strength/endurance for ADLs.    7.  Patient will utilize energy conservation techniques during functional activities with verbal cues.    PLOF: Pt lives w/ dtr in a 1 , has ramped entrance, tub/shower. Dtr assists w/ ADLs as needed. Pt uses rollator as needed for functional mobility.     Outcome: Progressing   OCCUPATIONAL THERAPY TREATMENT    Patient: Jacqueline Barnes (64 y.o. female)  Date: 7/24/2025  Diagnosis: Essential hypertension [I10]  Complicated UTI (urinary tract infection) [N39.0]  Sepsis ([facility]) [A41.9]  Congestive heart failure, unspecified HF chronicity, unspecified heart failure type ([facility]) [I50.9]  Sepsis with acute renal failure, due to unspecified organism, unspecified acute renal failure type, unspecified whether septic shock present ([facility]) [A41.9, R65.20, N17.9] Complicated UTI (urinary tract infection)  Procedure(s) (LRB):  ESOPHAGOGASTRODUODENOSCOPY w/ 52 Fr dilation, BXs (N/A) 3 Days Post-Op  Precautions: Fall Risk, General Precautions,  ,  ,  ,  ,  ,  ,      Chart, occupational therapy assessment, plan of care, and goals were reviewed.  ASSESSMENT:  Pt presented sitting on BSC upon entry with daughter present. Pt 
  Problem: Physical Therapy - Adult  Goal: By Discharge: Performs mobility at highest level of function for planned discharge setting.  See evaluation for individualized goals.  Description: Physical Therapy Goals:  Initiated 7/23/2025 to be met within 7-10 days.    1.  Patient will move from supine to sit and sit to supine , scoot up and down, and roll side to side in bed with supervision/set-up.    2.  Patient will transfer from bed to chair and chair to bed with supervision/set-up using the least restrictive device.  3.  Patient will perform sit to stand with supervision/set-up.  4.  Patient will ambulate with supervision/set-up for 50 feet with the least restrictive device.       PLOF: Pt lives with her Daughter in a single story home with ramped entrance.  Qi for mobility with use of rollator.      Outcome: Progressing       PHYSICAL THERAPY EVALUATION    Patient: Jacqueline Barnes (64 y.o. female)  Date: 7/23/2025  Primary Diagnosis: Essential hypertension [I10]  Complicated UTI (urinary tract infection) [N39.0]  Sepsis ([facility]) [A41.9]  Congestive heart failure, unspecified HF chronicity, unspecified heart failure type ([facility]) [I50.9]  Sepsis with acute renal failure, due to unspecified organism, unspecified acute renal failure type, unspecified whether septic shock present ([facility]) [A41.9, R65.20, N17.9]  Procedure(s) (LRB):  ESOPHAGOGASTRODUODENOSCOPY w/ 52 Fr dilation, BXs (N/A) 2 Days Post-Op   Precautions: Fall Risk, General Precautions,  ,  ,  ,  ,  ,  ,      ASSESSMENT :  Pt resting in bed upon entering room, agreeable to PT evaluation despite moaning in pain and pointing to R upper quadrant. Pt is A&Ox2 to person and time, oriented to place.  Pt is very impulsive with her movements and moves quickly at times requiring constant verbal cues for safety.  Pt was able to perform supine to sit transfer with [place], upon sitting up throws herself back in to the bed and states she is dizzy.  Pt returned sitting EOB 
  Problem: Physical Therapy - Adult  Goal: By Discharge: Performs mobility at highest level of function for planned discharge setting.  See evaluation for individualized goals.  Description: Physical Therapy Goals:  Initiated 7/23/2025 to be met within 7-10 days.    1.  Patient will move from supine to sit and sit to supine , scoot up and down, and roll side to side in bed with supervision/set-up.    2.  Patient will transfer from bed to chair and chair to bed with supervision/set-up using the least restrictive device.  3.  Patient will perform sit to stand with supervision/set-up.  4.  Patient will ambulate with supervision/set-up for 50 feet with the least restrictive device.       PLOF: Pt lives with her Daughter in a single story home with ramped entrance.  Qi for mobility with use of rollator.      Outcome: Progressing     PHYSICAL THERAPY TREATMENT    Patient: Jacqueline Barnes (64 y.o. female)  Date: 7/24/2025  Diagnosis: Essential hypertension [I10]  Complicated UTI (urinary tract infection) [N39.0]  Sepsis ([facility]) [A41.9]  Congestive heart failure, unspecified HF chronicity, unspecified heart failure type ([facility]) [I50.9]  Sepsis with acute renal failure, due to unspecified organism, unspecified acute renal failure type, unspecified whether septic shock present ([facility]) [A41.9, R65.20, N17.9] Complicated UTI (urinary tract infection)  Procedure(s) (LRB):  ESOPHAGOGASTRODUODENOSCOPY w/ 52 Fr dilation, BXs (N/A) 3 Days Post-Op  Precautions: Fall Risk, General Precautions,  ,  ,  ,  ,  ,  ,      ASSESSMENT:  Pt resting in bed upon entering room, Daughter at bedside and agreeable to PT treatment.  Pt notes she would like to wash up, upon leaving the room to get supplies Daughter assisted pt to BSC.  Once finished pt was able to perform self samir care in standing and agreeable to ambulate around room with RW requiring SBA to CGA.  Pt then sat EOB and assisted pt in washing herself up at bedside demonstrating good 
  Problem: SLP Adult - Impaired Swallowing  Goal: By Discharge: Advance to least restrictive diet without signs or symptoms of aspiration for planned discharge setting.  See evaluation for individualized goals.  Description: Patient will:  1. Tolerate PO trials with 0 s/s overt distress in 4/5 trials  2. Utilize compensatory swallow strategies/maneuvers (decrease bite/sip, size/rate, alt. liq/sol) with min cues in 4/5 trials  3. Perform oral-motor/laryngeal exercises to increase oropharyngeal swallow function with min cues  4. Complete an objective swallow study (i.e., MBSS) to assess swallow integrity, r/o aspiration, and determine of safest LRD, min A as indicated/ordered by MD     Rec:     Full thin liquids  Aspiration precautions  HOB >45 during po intake, remain >30 for 30-45 minutes after po   Small bites/sips; alternate liquid/solid with slow feeding rate   Oral care TID  Meds per pt preference  MBS to further assess oropharyngeal swallow fxn    Outcome: Progressing   SPEECH LANGUAGE PATHOLOGY BEDSIDE SWALLOW EVALUATION/TREATMENT    Patient: Jacqueline Barnes (64 y.o. female)  Date: 7/23/2025  Primary Diagnosis: Essential hypertension [I10]  Complicated UTI (urinary tract infection) [N39.0]  Sepsis ([facility]) [A41.9]  Congestive heart failure, unspecified HF chronicity, unspecified heart failure type ([facility]) [I50.9]  Sepsis with acute renal failure, due to unspecified organism, unspecified acute renal failure type, unspecified whether septic shock present ([facility]) [A41.9, R65.20, N17.9]  Procedure(s) (LRB):  ESOPHAGOGASTRODUODENOSCOPY w/ 52 Fr dilation, BXs (N/A) 2 Days Post-Op   Precautions: Aspiration  PLOF: As per H&P  ASSESSMENT:  Based on the objective data described below, the patient presents with suspected esophageal dysphagia (warrants further examination). Pt with ~6 oz serial swallows of thin liquids via straw with no overt s/sx of aspiration; however, immediate large emesis occurred post swallowing trials. 
  Problem: SLP Adult - Impaired Swallowing  Goal: By Discharge: Advance to least restrictive diet without signs or symptoms of aspiration for planned discharge setting.  See evaluation for individualized goals.  Description: Patient will:  1. Tolerate PO trials with 0 s/s overt distress in 4/5 trials- met  2. Utilize compensatory swallow strategies/maneuvers (decrease bite/sip, size/rate, alt. liq/sol) with min cues in 4/5 trials- met  3. Perform oral-motor/laryngeal exercises to increase oropharyngeal swallow function with min cues- n/a  4. Complete an objective swallow study (i.e., MBSS) to assess swallow integrity, r/o aspiration, and determine of safest LRD, min A as indicated/ordered by MD- met 7/23/25    Rec:     Full thin liquids  Aspiration precautions  HOB >45 during po intake, remain >30 for 30-45 minutes after po   Small bites/sips; alternate liquid/solid with slow feeding rate   Oral care TID  Meds per pt preference      7/23/2025 1128 by Tarah Miguel, SLP  Outcome: Progressing   SPEECH PATHOLOGY MODIFIED BARIUM SWALLOW STUDY/TREATMENT & DISCHARGE    Patient: Jacqueline Barnes (64 y.o. female)  Date: 7/23/2025  Primary Diagnosis: Essential hypertension [I10]  Complicated UTI (urinary tract infection) [N39.0]  Sepsis ([facility]) [A41.9]  Congestive heart failure, unspecified HF chronicity, unspecified heart failure type ([facility]) [I50.9]  Sepsis with acute renal failure, due to unspecified organism, unspecified acute renal failure type, unspecified whether septic shock present ([facility]) [A41.9, R65.20, N17.9]  Procedure(s) (LRB):  ESOPHAGOGASTRODUODENOSCOPY w/ 52 Fr dilation, BXs (N/A) 2 Days Post-Op   Precautions: Aspiration    ASSESSMENT :  Difficult assessment as pt was moving rapidly during PO trials, often moving out of fluoro screen. Pt did not appear to aspirate on thin via spoon or large cup/sip. She began gagging prior to initiation of swallow with spoon trial. She also demonstrated emesis event with 
  Problem: Safety - Adult  Goal: Free from fall injury  Outcome: Progressing  Flowsheets (Taken 7/19/2025 1343)  Free From Fall Injury: Instruct family/caregiver on patient safety  Note: Bed alarm, offer bathroom runs Q2     
  Problem: Safety - Adult  Goal: Free from fall injury  Outcome: Progressing  Flowsheets (Taken 7/21/2025 1948)  Free From Fall Injury: Instruct family/caregiver on patient safety  Note: Bed alarm     Problem: Pain  Goal: Verbalizes/displays adequate comfort level or baseline comfort level  Outcome: Progressing  Flowsheets  Taken 7/21/2025 1948  Verbalizes/displays adequate comfort level or baseline comfort level:   Encourage patient to monitor pain and request assistance   Assess pain using appropriate pain scale   Administer analgesics based on type and severity of pain and evaluate response   Implement non-pharmacological measures as appropriate and evaluate response  Taken 7/21/2025 0902  Verbalizes/displays adequate comfort level or baseline comfort level: Encourage patient to monitor pain and request assistance     
with appropriate goals if chronic or comorbid symptoms are exacerbated and prevent overall improvement and discharge     Problem: Discharge Planning  Goal: Discharge to home or other facility with appropriate resources  7/23/2025 2023 by Georgina Saucedo RN  Outcome: Progressing  Flowsheets (Taken 7/23/2025 2023)  Discharge to home or other facility with appropriate resources: Identify barriers to discharge with patient and caregiver

## 2025-07-24 NOTE — CARE COORDINATION
DC order noted. Daughter called [facility] CM and confirmed that patient has HH through [facility].     [facility] CM [place] [facility] [place] [phone] so they can submit authorization for resumption of care.     [facility] faxed. Family at bedside to transport.    Confirmation received. Patient good to dC from [facility] standpoint.     ROBINSON Bustillo   Inpatient Case Management (ICM)   [facility]

## 2025-07-25 ENCOUNTER — TELEPHONE (OUTPATIENT)
Facility: CLINIC | Age: 65
End: 2025-07-25

## 2025-07-25 ENCOUNTER — CLINICAL DOCUMENTATION (OUTPATIENT)
Facility: CLINIC | Age: 65
End: 2025-07-25

## 2025-07-25 NOTE — DISCHARGE SUMMARY
Unremarkable for age. Peritoneal Spaces: No free fluid or free air. Body wall: Negative. Bones: Unremarkable for age.     No evidence of bowel obstruction. Correlate clinically for mild proctitis with regional mild inflammatory fat stranding. Large, chronic, complex fluid collection closely confined to superficial and deep borders of right lower chest wall repair mesh. Significant growth since 2016. Dorsal-upper edge of the mesh may have come free from its attachment. The collection causes mass effect on right liver. Decreased size of left ovarian chronic cyst. Electronically signed by Tarah Diaz    Echo (TTE) limited (PRN contrast/bubble/strain/3D)  Result Date: 7/19/2025    Left Ventricle: Normal left ventricular systolic function with a visually estimated EF of 55 - 60%. Left ventricle size is normal. Normal wall thickness. Normal wall motion.   Right Ventricle: Right ventricle size is normal. Normal systolic function. TAPSE is normal. TAPSE is 1.8 cm.   Aortic Valve: No regurgitation.   Mitral Valve: Moderate paravalvular regurgitation.   Tricuspid Valve: Mild regurgitation. RVSP is 35 mmHg.   Pulmonic Valve: No regurgitation.   Pericardium: No pericardial effusion.   Image quality is adequate. Technically difficult study due to patient's body habitus.     XR ABDOMEN (KUB) (SINGLE AP VIEW)  Result Date: 7/19/2025  EXAM: Abdomen - 1 view HISTORY: Bowel obstruction COMPARISON: 3/3/2016 FINDINGS: No dilated bowel. Moderate rectal stool burden. Cholecystectomy clips. No acute bone findings.     Nonobstructive bowel gas pattern. Moderate rectal stool burden. Electronically signed by Tarah Diaz    XR CHEST (2 VW)  Result Date: 7/18/2025  STUDY:PA / Lateral Chest HISTORY: Shortness of Breath; Chest discomfort. COMPARISONS: Chest radiograph 2/14/2016 FINDINGS: Life-support devices and or cardiac hardware: None. Heart and mediastinum: Stable. Lungs: Elevation of the right hemidiaphragm. Patchy right basilar

## 2025-07-25 NOTE — PROGRESS NOTES
PATIENT REQUIRES TCM OUTREACH    MRN: [id number]     PATIENT:  Jacqueline Barnes    ADMIT DATE:   7/18/25    DISCHARGE DATE:  7/24/25     ADMITTING DX: sepsis/dysphagia    MEDICATION CHANGES:   Start: Reglan  Stop:  n/a  Change: n/a  Pause: Lasix    SPECIALISTS:  GI, Surgery, ID, and IR    HOME HEALTH/WOUND CARE/PT/OT:  home health care and SN    SCHEDULED FOLLOW UP APPTS:    Future Appointments   Date Time Provider Department Center   7/29/2025 10:20 AM Reema Lainez, APRN - NP [facility] DE[facility]       *Please contact patient within 2 business days and document under .TCMOFFICE.  A scheduled Hospital Follow-up for patient within 7 days is preferred*

## 2025-07-25 NOTE — TELEPHONE ENCOUNTER
Care Transitions Initial Follow Up Call    Outreach made within 2 business days of discharge: Yes    Patient: Jacqueline Barnes Patient : 1960   MRN: [id number]  Reason for Admission: 25  Discharge Date: 25       Spoke with: Daughter    Discharge department/facility: [facility] Interactive Patient Contact:  Was patient able to fill all prescriptions: Yes  Was patient instructed to bring all medications to the follow-up visit: No:   Is patient taking all medications as directed in the discharge summary? Yes  Does patient understand their discharge instructions: Yes  Does patient have questions or concerns that need addressed prior to 7-14 day follow up office visit: no    Additional needs identified to be addressed with provider  [facility] is new PCP           Scheduled appointment with PCP within 7-14 days    Follow Up  Future Appointments   Date Time Provider Department Center   2025 10:20 AM Reema Lainez, JUMA - NP [facility]       Jocy Galvez MA          If not able to reach patient notate left voicemail unable to reach patient. Close note     2 attempts needed.

## 2025-07-25 NOTE — PROGRESS NOTES
[email or web address]  [phone]    Gastroenterology Progress Note    Impression:  1. Dysphagia - Acute on chronic issue. Daughter states problems w/ swallowing at home w/ food/liquid getting stuck in throat contributing to regurgitation intermittently after PO a few times weekly but seems worse during this admission with constant sx w vomiting as well. EGD 7/21 w/ non-obstructive dysphagia w/ dilation.   2. GERD - on PPI as OP  3. PNA  4. Constipation w/ BRBPR - very small volume noted 7/20. H/H nml.  5. RUQ pain - new during admission. Unclear etiology of abd pain (EGD nml, hx of CCY, CT neg, CTA neg, LFT nml). Consider large seroma as source, IR unable to drain w/ high infection risk.   6. Abd fluid collection - Large intra-abdominal (7 cm) fluid collection RUQ along surgical mesh with mass effect to RUQ and displacement of liver - inc significantly in size since 2016. This is a result of desmoid tumor surgery in 2010.   7. Sepsis - suspected UTI etiology  6. ?Dementia      10/2012 EGD-[place] - EGD w/ HH, benign HP neg gastric polyps; colo wnl.        Plan:  1. Discussed w/ SLP, plans for MBSS today which we agree with. No indication for PEG with few days of more significant n/v, dysphagia.   2. Short course of Reglan added in case there is a gastroparesis component. Continue PPI, Linzess. Consider dicyclomine PRN. Cannot try TCA given QT prolongation. Discussed w/ primary - add enema as no BM documented since admission.   3. Colonoscopy needed as an OP.  4. Continue medical management per primary.      Chief Complaint: RUQ pain, dysphagia       Subjective:  RUQ pain, n/v, dysphagia.     ROS: Denies any BM, hematochezia.       General: well nourished, no acute distress  Skin: no rashes, ulcers, or icterus.  Eyes: conjunctiva normal, EOM normal   HEENT: normocephalic, atraumatic  Pulmonary: breath effort normal w/o accessory muscle use.   Abdominal: non-distended, soft, moderate RUQ TTP, non-acute  Psychiatric: 
  [facility] Hospitalist Group  Progress Note    Patient: Jacqueline Barnes Age: 64 y.o. : 1960 MR#: [id number] SSN: [id number]  Date: 2025                 DVT Prophylaxis:  []Lovenox  [x]Hep SQ  []SCDs  []Coumadin   []On Heparin gtt []PO anticoagulant    Anticipated discharge: 2025 to home, pending clinical course    Subjective:     The patient was seen and examined at the bedside in follow-up for dysphagia, constipation, pneumonia, hypokalemia and acute kidney injury among other issues.  Her daughter was also in the room.  Patient appeared quite fatigued and complained of lower abdominal pain.  Her daughter mentioned that the patient also had bright red blood in the stool today.  Indeed, there were a few drops of bright red blood in the toilet bowl.  The patient was oriented to self, partially to place and time.  While she was talking, she fell asleep and was difficult to arouse.  Patient finally woke up after a strong sternal rub.  Immediately after she woke up, the patient was confused. She was not oriented to self, to place or to time.  However, a few minutes later, the patient's mentation improved.     The patient daughter said that the patient has had \"stress seizures \"for several years.  She is not on any antiepileptic drug therapy.  Patient was evaluated by neurology in the outpatient setting.  She underwent extensive monitoring including continuous EEG monitoring but was not found to have any epileptiform activity.  She was suspected to have psychogenic nonepileptiform events.      Objective:   VS: /82   Pulse 99   Temp 98.2 °F (36.8 °C) (Oral)   Resp 18   Ht 1.676 m (5' 6\")   Wt 111.1 kg (245 lb)   LMP  (LMP Unknown)   SpO2 99%   BMI 39.54 kg/m²    Tmax/24hrs: Temp (24hrs), Av.1 °F (36.7 °C), Min:97.9 °F (36.6 °C), Max:98.4 °F (36.9 °C)    Intake/Output Summary (Last 24 hours) at 2025 1146  Last data filed at 2025 0255  Gross per 
  [facility] Hospitalist Group  Progress Note    Patient: Jacqueline Barnes Age: 64 y.o. : 1960 MR#: [id number] SSN: [id number]  Date: 2025                 DVT Prophylaxis:  []Lovenox  [x]Hep SQ  []SCDs  []Coumadin   []On Heparin gtt []PO anticoagulant    Anticipated discharge: 2025 to home, pending clinical course    Subjective:     The patient is new to me today.  She was seen and examined at the bedside in follow-up of her suspected aspiration pneumonia, constipation, severe hypokalemia and suspected dysphagia.  Her daughter was also in the room.  Patient asked if she could go home but, her symptoms have not resolved.  She complained of nausea and vomiting.  She said that she throws up both liquids and solids soon after she tries to consume them.  Patient also reported intermittent globus sensation.  She denied any abdominal pain.  Her last bowel movement was about 5 days ago.  She denied any sick contacts, suspected food contamination or recent travel.      Objective:   VS: /82   Pulse 94   Temp 98.1 °F (36.7 °C) (Oral)   Resp 16   Ht 1.676 m (5' 6\")   Wt 111.1 kg (245 lb)   LMP  (LMP Unknown)   SpO2 98%   BMI 39.54 kg/m²    Tmax/24hrs: Temp (24hrs), Av.3 °F (36.8 °C), Min:97.7 °F (36.5 °C), Max:98.9 °F (37.2 °C)  No intake or output data in the 24 hours ending 25 1646     PHYSICAL EXAM  General Appearance: No acute distress  HENT: normocephalic/atraumatic, moist mucus membranes  Neck: No JVD, supple  Lungs: CTA with normal respiratory effort  CV: RRR,   Abdomen: soft, nontender, nondistended; bowel sounds slightly diminished  : no suprapubic tenderness   Extremities: Moves extremities spontaneously  Neuro: Alert and oriented  Skin: Warm and dry  Psych: appropriate mood and affect    Current Facility-Administered Medications   Medication Dose Route Frequency    polyethylene glycol (GLYCOLAX) packet 17 g  17 g Oral Daily    [START ON 
  [facility] Hospitalist Group  Progress Note    Patient: Jacqueline Barnes Age: 64 y.o. : 1960 MR#: [id number] SSN: [id number]  Date: 2025                 DVT Prophylaxis:  []Lovenox  [x]Hep SQ  []SCDs  []Coumadin   []On Heparin gtt []PO anticoagulant    Anticipated discharge: 2025 to home, pending clinical course    Subjective:     The patient was seen and examined at the bedside.  Her daughter was also in the room.  Patient was resting in bed.  She appeared comfortable.  Patient had a modified barium swallow study today, the results of which were inconclusive.  Patient's oral intake has improved slightly.  Her daughter tried to give her a few sips of water.  The patient tolerated a few sips of water but then started burping, and vomited the water that she drank.  Patient's abdominal pain seems to be controlled.  She has also had a few bowel movements today.      The patient's daughter said that the patient was last seen by her surgeon about 1 to 2 years ago.  At that time, her surgeon had reportedly not mentioned the intra-abdominal fluid collection to the patient or to her daughter.  She said that she called the surgeon this morning.  She said that her mother's surgeon would review the records and get back to her.          Objective:   VS: /76   Pulse 90   Temp 98.2 °F (36.8 °C) (Oral)   Resp 19   Ht 1.676 m (5' 6\")   Wt 111.1 kg (245 lb)   LMP  (LMP Unknown)   SpO2 97%   BMI 39.54 kg/m²    Tmax/24hrs: Temp (24hrs), Av.1 °F (36.7 °C), Min:98 °F (36.7 °C), Max:98.2 °F (36.8 °C)  No intake or output data in the 24 hours ending 25 1411       PHYSICAL EXAM  General Appearance: Looks poorly nourished  HENT: normocephalic/atraumatic, moist mucus membranes  Neck: No JVD, supple  Lungs: Faint inspiratory rhonchi auscultated in the posterior lung fields  CV: RRR,   Abdomen: soft, nondistended;  right upper quadrant tenderness without guarding or 
  TRANSFER - IN REPORT:    Verbal report received from Florecita duarte Jacqueline Barnes  being received from [address] [place] [address] for ordered procedure      Report consisted of patient’s Situation, Background, Assessment and   Recommendations(SBAR).     Information from the following report(s) Nurse Handoff Report was reviewed with the receiving nurse.    Opportunity for questions and clarification was provided.      Assessment completed upon patient’s arrival to unit and care assume        
  [email or web address]  [phone]    GASTROENTEROLOGY CONSULT        [email or web address]  [phone]    Gastroenterology Progress Note    Impression:  1. Dysphagia - Acute on chronic issue. Daughter states problems w/ swallowing at home w/ food/liquid getting stuck in throat contributing to regurgitation intermittently after PO a few times weekly but seems worse during this admission with constant sx.   2. GERD - on PPI as OP  3. PNA  4. Constipation w/ BRBPR - very small volume noted 7/20. H/H nml.  5. RUQ pain - new during admission.  6. Abd fluid collection - Large intra-abdominal (7 cm) fluid collection RUQ along surgical mesh with mass effect to RUQ and displacement of liver - inc significantly in size since 2016. This is a result of desmoid tumor surgery in 2010.   7. Sepsis - suspected UTI etiology  6. ?Dementia       10/2012 EGD-[place] - EGD w/ HH, benign HP neg gastric polyps; colo wnl.       Plan:  1. EGD w/ possible dilation today. Continue NPO status. Procedure posted to board.   2. IV PPI BID  3. Colonoscopy needed as an OP.   4. Continue medical management per primary.     Chief Complaint: RUQ pain, dysphagia       Subjective:  moderate RUQ pain. Unable to tolerate PO yesterday     ROS: Denies any fevers, chills, nausea, vomiting.       General: well nourished, no acute distress  Skin: no rashes, ulcers, or icterus.  Eyes: conjunctiva normal, EOM normal   HEENT: normocephalic, atraumatic  Pulmonary: breath effort normal w/o accessory muscle use.   Abdominal: non-distended, soft, moderate RUQ TTP, non-acute  Psychiatric: within normal limits for recent and remote events.    Patient Active Problem List   Diagnosis    Ovarian cystic mass    Gastroparesis    MADHAV (obstructive sleep apnea)    Atrial fibrillation and flutter (HCC)    Seizure disorder (HCC)    Iron deficiency anemia    Adnexal mass    Gastroesophageal reflux disease with esophagitis    Gastric polyps    Hereditary lymphedema    Lung nodules    
 General Surgery Consult    Jacqueline Barnes  Admit date: 2025    MRN: [id number]     : 1960     Age: 64 y.o.        Attending Physician: Sarath Barragan MD [facility]      History of Present Illness:      Jacqueline Barnes is a 64 y.o. female who we are following for evaluation of abdominal pain with nausea and vomiting as well as a large fluid collection in the chest status post desmoid tumor removal and mesh placement.  The patient underwent an upper endoscopy that showed no evidence of any obstruction in the stomach or duodenum.     Patient Active Problem List    Diagnosis Date Noted    Psychogenic nonepileptic seizure 2025    Community acquired pneumonia of right lower lobe of lung 2025    Hypokalemia 2025    Other constipation 2025    Acute kidney injury 2025    Prolonged QT interval 2025    Oropharyngeal dysphagia 2025    Sepsis with acute renal failure ([facility]) 2025    Prediabetes 2022    COPD without exacerbation ([facility]) 2022    CHF (congestive heart failure) ([facility]) 2022    Obesity, Class III, BMI 40-49.9 (morbid obesity) ([facility]) 2020    Iron deficiency anemia 2019    Lung nodules 2019    Moderate persistent asthma without complication 2019    Allergic rhinitis 2019    Atrial fibrillation and flutter ([facility]) 2019    Hereditary lymphedema 2018    Gastroesophageal reflux disease with esophagitis 2018    Gastric polyps 2018    Gastroparesis 08/15/2018    Multiple thyroid nodules 2018    Vitamin D deficiency 2018    Anxiety 2018    Essential hypertension 2018    Dyslipidemia 2018    Ovarian cystic mass 2013    MADHAV (obstructive sleep apnea) 2010    Seizure disorder ([facility]) 2010    Adnexal mass 2010     Past Medical History:   Diagnosis Date    Allergic rhinitis     Anemia 2010    Asthma 2010    CHF (congestive heart failure) ([facility]) 
 General Surgery Consult    Jacqueline Barnes  Admit date: 2025    MRN: [id number]     : 1960     Age: 64 y.o.        Attending Physician: Sarath Barragan MD [facility]      History of Present Illness:      Jacqueline Barnes is a 64 y.o. female who we are following for evaluation of severe abdominal pain in the setting of multiple comorbidities.  The patient is still complaining of abdominal pain as well as dysphagia.  She had an upper endoscopy that was normal and she had a swallow evaluation that showed no evidence of obstruction and no significant aspiration and the patient was advised to continue with p.o. diet.  She is also been seen by the GI team for evaluation of her dysphagia and her abdominal pain and they do not believe that she has any obstruction.  The patient has no fever and currently no tachycardia but she had 1 episode of 103 but otherwise she has been having normal heart rate.  The patient has no leukocytosis but for the past 3 days she has been neutropenic with WBC less than 4000.  When I saw the patient today she stated that she is feeling much better surprisingly she.  Stated her abdominal pain is improving and she is able to eat.    Patient Active Problem List    Diagnosis Date Noted    Normocytic anemia 2025    Psychogenic nonepileptic seizure 2025    Community acquired pneumonia of right lower lobe of lung 2025    Hypokalemia 2025    Other constipation 2025    Acute kidney injury 2025    Prolonged QT interval 2025    Oropharyngeal dysphagia 2025    Sepsis with acute renal failure (HCC) 2025    Prediabetes 2022    COPD without exacerbation ([facility]) 2022    CHF (congestive heart failure) ([facility]) 2022    Obesity, Class III, BMI 40-49.9 (morbid obesity) ([facility]) 2020    Iron deficiency anemia 2019    Lung nodules 2019    Moderate persistent asthma without complication 2019    Allergic rhinitis 
1900-Assisted pt up to [facility] to have a BM. After the BM, assisted her back to side of bed. She started to wheeze and breath fast, I asked to slow down on her breathing. Within 15 seconds of that breathing she fell back on the bed. Seemed to be unconscious. Daughter rubbed ice on her face and in 10 seconds pt sat up, back to normal and breathing normal.   Vital stable (138/93, 99 HR, O2 sats 99 % on room air). Daughter states she does this at home. Will notify MD.     
4 Eyes Skin Assessment     NAME:  Jacqueline Barnes  YOB: 1960  MEDICAL RECORD NUMBER:  [id number]    The patient is being assessed for  Admission    I agree that at least one RN has performed a thorough Head to Toe Skin Assessment on the patient. ALL assessment sites listed below have been assessed.      Areas assessed by both nurses:    Head, Face, Ears, Shoulders, Back, Chest, Arms, Elbows, Hands, Sacrum. Buttock, Coccyx, Ischium, Legs. Feet and Heels, and Under Medical Devices         Does the Patient have a Wound? No noted wound(s)       [place]eland Prevention initiated by RN: No  Wound Care Orders initiated by RN: No    For hospital-acquired stage 1 & 2 and ALL Stage 3,4, Unstageable, DTI, NWPT, and Complex wounds: place order “IP Wound Care/Ostomy Nurse Eval and Treat” by RN under : No    New Ostomies, if present place, Ostomy referral order under : No     Nurse 1 eSignature: Electronically signed by Myrtle Rand RN on 7/19/25 at 5:14 AM EDT    **SHARE this note so that the co-signing nurse can place an eSignature**    Nurse 2 eSignature: Electronically signed by Polina Pace RN on 7/19/25 at 5:14 AM EDT    
[facility] ANTIMICROBIAL STEWARDSHIP TEAM  PRESCRIBER COMMUNICATION FORM      We have briefly reviewed the antimicrobials  currently prescribed for your patient along with  the clinical indications and would like to Make the following recommendations:    DISCONTINUE ANTIBIOTICS      Rationale:    (  )  No evidence of bacterial infection    (  )  Microbiology report does not support use    (  )  Narrowing broad-spectrum empiric coverage    (  )  Therapeutic duplication: overlapping antimicrobial spectrum    (  )  Clinical situation dictates change    (  )  Prolonged duration of therapy not needed    (  )  Allergy/toxicity/drug interaction present    (  ) More clinically/cost effective therapy available  ADD ANTIBIOTICS  Rationale:        (  ) Microbiology report supports use    (  ) Expanded coverage needed: gm positive /negative / anaerobic /                               atypical    ( ) More clinicaly/cost effective therapy than current regimen    ( ) Needed for synergy    ( ) Double coverage needed    ( ) Less toxic therapy    ( ) Antifungal therapy needed  ADDITIONAL SUGGESTIONS    ( ) continue current therapy with the following change    ( ) additional laboratory testing    ( ) consider infectious disease consultation    ( ) consider outpatient IV therapy    ( ) other    COMMENTS: agree with plans to discontinue metronidazole.       This communication is not to be considered a consultation. You are under no obligation to follow these suggestions. A physician-patient relationship has not been established between the physician Completing this form and your patient. If a thorough analysis of the case is desired, please request an ID consultation.  
Occupational Therapy  OT orders received and chart reviewed. MD request hold at this time due to pt's high pain levels. On second attempt, RN requested to continue hold. Will continue to follow and see as appropriate.     Jena Griffin, AGUSTÍN, OTR/L   
Patient attempted to consume an ice nugget. Patient vomited again. Provider aware.  
Patient daughter came to notify nurse of difficulty breathing. This nurse went into room and assessed patient O2 stats found at 79% on room air and heart rate was 123. Patient had nausea and vomiting during this episode, however was able to produce a small amount of emesis. This nurse applied 4L to patient and O2 improved to 100%. Slowly brought down O2 to get patient back on room air. O2 100% on room air prior to leaving room. Zofran given.  
Patient emboldened to try ice again. Patient has had 3 handfuls of ice so far and no vomiting. Patient received Reglan prior to ice and enema.  
Patient has produced more small hard stools. Attempted to give patient jello and patient tolerated okay, Patient vomited small amount.  
Patient tolerated dinner well (tomato soup) post reglan push.  
Patient tolerated soap suds enema well. Large amounts of brown watery fluid and small amounts of fecal matter present.    12:34: Patient able to release small bowel movement. Hard stool sitting at the rectal opening post enema. Patient given prune juice to assist.    12:45 Patient released another small bowel movement.  
Physical Therapy  PT order received and chart reviewed.  Upon entering room MD and RN present, pt sitting in chair visibly in pain and crying.  MD requests for therapy to return at a later time if able due to pt's high pain levels.  On second attempt pt still in quite a bit of pain, moaning in discomfort with RN present in room.  Will continue to follow and see as appropriate.     Ursula Abreu, PT, DPT     
Pt ate pudding and had clear liquid (water), she tolerated well, no nausea or vomiting observed.  Morning PO med administered, pt tolerated well, pt's daughter at bedside, kayy light within pt's reach.  
Reviewed discharge papers with patient and daughter at bedside; all questions answered; all belongings taken at time of discharge; IV access removed without complications  
Speech Pathology:     Per Dr. Russell: hold off on swallow eval this date. Will re-attempt next business date or as medical condition permits.     Thank you for this referral.    Tarah Miguel M.S., CCC-SLP/L  Speech-Language Pathologist    
Speech Pathology:     Per nursing: pt tolerated ~6 oz of juice and a few handfuls of ice this PM without difficulty.     Thank you for this referral.    Tarah Miguel M.S., CCC-SLP/L  Speech-Language Pathologist    
Speech Therapy:    Evaluation orders received. Upon completion of chart review and discussion with RN, pt not appropriate for SLP evaluation this date.  Currently, patient is:    PT currently NPO due to upcoming GI procedure. Spoke with daughters.    Please contact SLP with questions/concerns.  SLP will follow up next day.    Thank you for the referral.     Carolyne Sargent M.A. CCC-SLP      
Speech Therapy:    Evaluation orders received. Upon completion of chart review and discussion with RN, pt not appropriate for SLP evaluation this date.  Currently, patient is:    [] Tolerating current po diet (per RN report)  [] Tolerating current po diet; however, poor po intake (per RN report)   [] Receiving nutrition via tube feeding   [] Lethargic, solomnent, or difficult to arouse for safe po trials     [] Unable to manage secretions  [] Receiving intervention for respiratory distress  [] < 6 hours s/p extubation   [x] Other: Attempted bedside swallow eval; pt vomited water, and had not been able to keep anything down per daughter. No other PO trials were attempted.    Please contact SLP with questions/concerns.  SLP will follow up next day.    Thank you for the referral.     Carolyne Sargent M.A. CCC-SLP      
Spiritual Health Progress Note  [facility]      Room # 419/01    Name: Jacqueline Barnes           Age: 64 y.o.    Gender: female          MRN: [id number]  Scientology: Jainism       Preferred Language: English      Date: 07/22/25  Visit Time: Begin Time: 1803 End Time : 1810  Complexity of Encounter: Moderate      Visit Summary:  met with patient.  provided active listening. 's plan of care includes intentional follow up. Family or visitors were not present during visit.     Referral/Consult From: Multi-disciplinary team  Encounter Overview/Reason: Spiritual/Emotional Needs, Advance Care Planning  Encounter Code: Encounter Code:  Assessment by  services   Crisis (if applicable):    Service Provided For: Patient     Patient was available.    Iliana, Belief, Meaning:   Patient has beliefs or practices that help with coping during difficult times  Family/Friends No family/friends present  Rituals (if applicable)      Importance and Influence:  Patient has spiritual/personal beliefs that influence decisions regarding their health  Family/Friends No family/friends present    Community:  Patient   No family/ friends present.  Family/Friends   No family/ friends present.    Assessment and Plan of Care:   Emotions Expressed by Patient:   Assessment: Calm    Interventions by :   Intervention: Active listening     Result/ Response by Patient:   Outcome: Encouraged    Patient Plan of Care:   Plan and Referrals  Plan/Referrals: Continue to visit, (comment)     Emotions Expressed by Spouse/Family/Friends:   calm     Interventions with Spouse/ Family/Friends include:   active listening    Spouse/Family/Friends Plan of Care:   Spiritual care available upon referral.      Electronically signed by Chaplain Kanika Mcknight  
Upon shift change patient stated to be vomiting. Patient given zofran and due to a clear liquid diet ginger ale. As soon as gingerale touched patient tongue, she vomited. Provider aware.  
Moderate persistent asthma without complication    Multiple thyroid nodules    Obesity, Class III, BMI 40-49.9 (morbid obesity) (HCC)    Vitamin D deficiency    Allergic rhinitis    Anxiety    Essential hypertension    CHF (congestive heart failure) (HCC)    COPD without exacerbation (HCC)    Prediabetes    Dyslipidemia    Sepsis with acute renal failure (HCC)    Community acquired pneumonia of right lower lobe of lung    Hypokalemia    Other constipation    Acute kidney injury    Prolonged QT interval    Oropharyngeal dysphagia    Psychogenic nonepileptic seizure         /85   Pulse 90   Temp 97.7 °F (36.5 °C) (Tympanic)   Resp 18   Ht 1.676 m (5' 6\")   Wt 111.1 kg (245 lb)   LMP  (LMP Unknown)   SpO2 100%   BMI 39.54 kg/m²         Intake/Output Summary (Last 24 hours) at 7/22/2025 1232  Last data filed at 7/22/2025 0755  Gross per 24 hour   Intake 5 ml   Output --   Net 5 ml       CBC w/Diff    Lab Results   Component Value Date/Time    WBC 3.7 (L) 07/21/2025 01:01 AM    RBC 4.04 (L) 07/21/2025 01:01 AM    RBC 4.97 06/30/2023 09:09 AM    HGB 12.0 07/21/2025 01:01 AM    HCT 36.7 07/21/2025 01:01 AM    MCV 90.8 07/21/2025 01:01 AM    MCH 29.7 07/21/2025 01:01 AM    MCHC 32.7 07/21/2025 01:01 AM    RDW 13.3 07/21/2025 01:01 AM     07/21/2025 01:01 AM    MPV 9.4 07/21/2025 01:01 AM    No results found for: \"MONO\", \"BANDS\", \"BASOS\", \"METAS\", \"PRO\"   Basic Metabolic Profile   Recent Labs     07/20/25  0400 07/21/25  0101    143   K 3.0* 3.2*    103   CO2 25 29   BUN 34* 21   GLUCOSE 121* 104   MG 2.2  --         Hepatic Function    No results found for: \"TP\" No components found for: \"SGOT\", \"GPT\", \"DBILI\", \"TBIL\"       Coags   No results for input(s): \"INR\", \"APTT\" in the last 72 hours.    Invalid input(s): \"PTP\"            Vinicius Chapman PA-C.   07/22/25, 12:37 PM   [facility]  [email or web address]  Phone: [phone]        
     Social History     Tobacco Use   Smoking Status Never   Smokeless Tobacco Never     Family History   Problem Relation Age of Onset    Hypertension Brother     Hypertension Sister     Asthma Daughter       Current Facility-Administered Medications   Medication Dose Route Frequency    ipratropium 0.5 mg-albuterol 2.5 mg (DUONEB) nebulizer solution 1 Dose  1 Dose Inhalation Q4H PRN    pantoprazole (PROTONIX) 40 mg in sodium chloride (PF) 0.9 % 10 mL injection  40 mg IntraVENous Q12H    dextrose 5 % and 0.45 % NaCl with KCl 40 mEq infusion   IntraVENous Continuous    linaclotide (LINZESS) capsule 290 mcg  290 mcg Oral QAM AC    HYDROmorphone (DILAUDID) injection 0.5 mg  0.5 mg IntraVENous Q4H PRN    oxyCODONE (ROXICODONE) immediate release tablet 2.5 mg  2.5 mg Oral Q4H PRN    polyethylene glycol (GLYCOLAX) packet 17 g  17 g Oral Daily    sulfur hexafluoride microspheres (LUMASON) 60.7-25 MG injection 2 mL  2 mL IntraVENous ONCE PRN    potassium bicarbonate (EFFER-K/K-LYTE) disintegrating tablet 50 mEq  50 mEq Oral Once    bisacodyl (DULCOLAX) suppository 10 mg  10 mg Rectal Daily PRN    metroNIDAZOLE (FLAGYL) 500 mg in 0.9% NaCl 100 mL IVPB premix  500 mg IntraVENous Q8H    ferrous sulfate (IRON 325) tablet 325 mg  325 mg Oral Daily    fluticasone (FLONASE) 50 MCG/ACT nasal spray 1 spray  1 spray Nasal BID    arformoterol 15 mcg-budesonide 0.25 mg neb solution   Nebulization BID RT    montelukast (SINGULAIR) tablet 10 mg  10 mg Oral Nightly    sodium chloride flush 0.9 % injection 5-40 mL  5-40 mL IntraVENous 2 times per day    sodium chloride flush 0.9 % injection 5-40 mL  5-40 mL IntraVENous PRN    0.9 % sodium chloride infusion   IntraVENous PRN    potassium chloride (KLOR-CON M) extended release tablet 40 mEq  40 mEq Oral PRN    Or    potassium bicarb-citric acid (EFFER-K) effervescent tablet 40 mEq  40 mEq Oral PRN    Or    potassium chloride 10 mEq/100 mL IVPB (Peripheral Line)  10 mEq IntraVENous PRN    
   RBC 3.93 (L) 07/24/2025 02:16 AM    RBC 4.97 06/30/2023 09:09 AM    HGB 11.6 (L) 07/24/2025 02:16 AM    HCT 35.7 07/24/2025 02:16 AM    MCV 90.8 07/24/2025 02:16 AM    MCH 29.5 07/24/2025 02:16 AM    MCHC 32.5 07/24/2025 02:16 AM    RDW 13.2 07/24/2025 02:16 AM     07/24/2025 02:16 AM    MPV 9.2 07/24/2025 02:16 AM    No results found for: \"MONO\", \"BANDS\", \"BASOS\", \"METAS\", \"PRO\"   Basic Metabolic Profile   Recent Labs     07/24/25  0216      K 3.6   *   CO2 25   BUN 9   GLUCOSE 100   MG 2.0        Hepatic Function    No results found for: \"TP\" No components found for: \"SGOT\", \"GPT\", \"DBILI\", \"TBIL\"       Coags   No results for input(s): \"INR\", \"APTT\" in the last 72 hours.    Invalid input(s): \"PTP\"            TREVON Saenz  7/24/2025, 9:42 AM   [facility]  [email or web address]  Office: [phone]  [facility] Cell: [phone] (M-F 7:30-4:30)       
and underwent right lower rib resection with mesh closure in 2010  General surgery consulted and input appreciated-do not feel that the patient's symptoms are related to this fluid collection which could be a seroma.  IR consulted for possible aspiration of the fluid collection    3.  Community-acquired pneumonia  Imaging studies showed patchy, right basilar opacities.  Community-acquired aspiration pneumonia is a concern.  Continue ceftriaxone and metronidazole  Maintain aspiration precautions    4.  Hypokalemia  Likely secondary to gastrointestinal losses of potassium; replace potassium and recheck levels  Serum magnesium levels are within the normal range    5.  QT prolongation  Could be secondary to hypokalemia; continue management of underlying etiology  2D echo was unremarkable  Minimize use of QT prolonging medications    6.  Acute kidney injury  Likely prerenal secondary to hypovolemia but acute kidney injury is resolved    7.  COPD without exacerbation  Continue inhaled bronchodilators and inhaled steroids    8.  Chronic constipation  Seems to be resolving; continue stool softeners and laxatives    9.  Psychogenic non-epileptform spells/events  Suspected; patient was evaluated by neurology in the outpatient setting.  She underwent extensive monitoring including continuous EEG monitoring but was not found to have any epileptiform activity.    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 Russell if there are any questions. Greater than 50 minutes were spent reviewing the patient's chart, obtaining a thorough history, performing a physical exam, placing orders and dictating this document.  Findings and plan were also discussed with the patient/patient's family and with RN.  Greater than 50% of the time was spent on direct patient care.           Signed By: [unfilled]     July 21, 2025 8:19 AM     
negative for any obstruction.  CT angiogram of abdomen and pelvis has been ordered to check for mesenteric ischemia  Maintain aspiration precautions    2.  Intra-abdominal fluid collection  CT scan of the abdomen pelvis without contrast reported a complex fluid collection in the lower chest/right upper quadrant causing mass effect on the liver.  Of note, the patient has a history of a desmoid tumor and underwent right lower rib resection with mesh closure in 2010  General surgery consulted and input appreciated-do not feel that the patient's symptoms are related to this fluid collection which could be a seroma.  IR consulted for possible aspiration of the fluid collection but they recommended against aspirating the fluid collection because of an increased risk of introducing an infection.      3.  Community-acquired pneumonia  Imaging studies showed patchy, right basilar opacities.  Community-acquired aspiration pneumonia is a concern.  Continue ceftriaxone and metronidazole  Maintain aspiration precautions    4.  Hypokalemia  Likely secondary to gastrointestinal losses of potassium; replace potassium and recheck levels  Serum magnesium levels are within the normal range    5.  QT prolongation  Could be secondary to hypokalemia; continue management of underlying etiology  2D echo was unremarkable  Minimize use of QT prolonging medications    6.  Acute kidney injury  Likely prerenal secondary to hypovolemia but acute kidney injury is resolved    7.  COPD without exacerbation  Continue inhaled bronchodilators and inhaled steroids    8.  Chronic constipation  Seems to be resolving; continue stool softeners and laxatives    9.  Psychogenic non-epileptform spells/events  Suspected; patient was evaluated by neurology in the outpatient setting.  She underwent extensive monitoring including continuous EEG monitoring but was not found to have any epileptiform activity.    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

## (undated) DEVICE — STERILE POLYISOPRENE POWDER-FREE SURGICAL GLOVES: Brand: PROTEXIS

## (undated) DEVICE — BITE BLOCK ENDOSCP UNIV AD 6 TO 9.4 MM

## (undated) DEVICE — UNDERPAD INCONT W23XL36IN STD BLU POLYPR BK FLUF SFT

## (undated) DEVICE — BASIN EMSIS 16OZ GRAPHITE PLAS KID SHP MOLD GRAD FOR ORAL

## (undated) DEVICE — MEDI-VAC NON-CONDUCTIVE SUCTION TUBING: Brand: CARDINAL HEALTH

## (undated) DEVICE — ENDOSCOPY PUMP TUBING/ CAP SET: Brand: ERBE

## (undated) DEVICE — SYRINGE MED 50ML LUERSLIP TIP

## (undated) DEVICE — LINER SUCT CANSTR 3000CC PLAS SFT PRE ASSEMB W/OUT TBNG W/

## (undated) DEVICE — GAUZE,SPONGE,4"X4",16PLY,STRL,LF,10/TRAY: Brand: MEDLINE

## (undated) DEVICE — FORCEPS BX L240CM JAW DIA2.4MM ORNG L CAP W/ NDL DISP RAD

## (undated) DEVICE — YANKAUER,SMOOTH HANDLE,HIGH CAPACITY: Brand: MEDLINE INDUSTRIES, INC.

## (undated) DEVICE — CATHETER SUCT TR FL TIP 14FR W/ O CTRL

## (undated) DEVICE — SOLUTION IRRIG 1000ML H2O STRL BLT

## (undated) DEVICE — CANNULA NSL AD TBNG L14FT STD PVC O2 CRV CONN NONFLARED NSL

## (undated) DEVICE — SYRINGE MED 25GA 3ML L5/8IN SUBQ PLAS W/ DETACH NDL SFTY